# Patient Record
Sex: MALE | Race: BLACK OR AFRICAN AMERICAN | NOT HISPANIC OR LATINO | URBAN - METROPOLITAN AREA
[De-identification: names, ages, dates, MRNs, and addresses within clinical notes are randomized per-mention and may not be internally consistent; named-entity substitution may affect disease eponyms.]

---

## 2020-05-26 ENCOUNTER — INPATIENT (INPATIENT)
Facility: HOSPITAL | Age: 60
LOS: 3 days | Discharge: HOME | End: 2020-05-30
Attending: HOSPITALIST | Admitting: HOSPITALIST
Payer: MEDICARE

## 2020-05-26 VITALS
RESPIRATION RATE: 17 BRPM | OXYGEN SATURATION: 100 % | SYSTOLIC BLOOD PRESSURE: 84 MMHG | TEMPERATURE: 98 F | DIASTOLIC BLOOD PRESSURE: 61 MMHG | HEART RATE: 115 BPM

## 2020-05-26 LAB
ALBUMIN SERPL ELPH-MCNC: 4.5 G/DL — SIGNIFICANT CHANGE UP (ref 3.3–5)
ALP SERPL-CCNC: 33 U/L — LOW (ref 40–120)
ALT FLD-CCNC: 12 U/L — SIGNIFICANT CHANGE UP (ref 4–41)
ANION GAP SERPL CALC-SCNC: 19 MMO/L — HIGH (ref 7–14)
AST SERPL-CCNC: 36 U/L — SIGNIFICANT CHANGE UP (ref 4–40)
BASE EXCESS BLDV CALC-SCNC: -4 MMOL/L — SIGNIFICANT CHANGE UP
BASOPHILS # BLD AUTO: 0.04 K/UL — SIGNIFICANT CHANGE UP (ref 0–0.2)
BASOPHILS NFR BLD AUTO: 0.4 % — SIGNIFICANT CHANGE UP (ref 0–2)
BILIRUB SERPL-MCNC: 0.4 MG/DL — SIGNIFICANT CHANGE UP (ref 0.2–1.2)
BLOOD GAS VENOUS - CREATININE: 2.25 MG/DL — HIGH (ref 0.5–1.3)
BLOOD GAS VENOUS - FIO2: 21 — SIGNIFICANT CHANGE UP
BUN SERPL-MCNC: 37 MG/DL — HIGH (ref 7–23)
CALCIUM SERPL-MCNC: 9.8 MG/DL — SIGNIFICANT CHANGE UP (ref 8.4–10.5)
CHLORIDE BLDV-SCNC: 101 MMOL/L — SIGNIFICANT CHANGE UP (ref 96–108)
CHLORIDE SERPL-SCNC: 93 MMOL/L — LOW (ref 98–107)
CO2 SERPL-SCNC: 18 MMOL/L — LOW (ref 22–31)
CREAT SERPL-MCNC: 2.26 MG/DL — HIGH (ref 0.5–1.3)
EOSINOPHIL # BLD AUTO: 0.01 K/UL — SIGNIFICANT CHANGE UP (ref 0–0.5)
EOSINOPHIL NFR BLD AUTO: 0.1 % — SIGNIFICANT CHANGE UP (ref 0–6)
GAS PNL BLDV: 131 MMOL/L — LOW (ref 136–146)
GLUCOSE BLDV-MCNC: 113 MG/DL — HIGH (ref 70–99)
GLUCOSE SERPL-MCNC: 121 MG/DL — HIGH (ref 70–99)
HCO3 BLDV-SCNC: 19 MMOL/L — LOW (ref 20–27)
HCT VFR BLD CALC: 35.9 % — LOW (ref 39–50)
HCT VFR BLDV CALC: 37.1 % — LOW (ref 39–51)
HGB BLD-MCNC: 11.8 G/DL — LOW (ref 13–17)
HGB BLDV-MCNC: 12.1 G/DL — LOW (ref 13–17)
IMM GRANULOCYTES NFR BLD AUTO: 0.4 % — SIGNIFICANT CHANGE UP (ref 0–1.5)
LACTATE BLDV-MCNC: 4 MMOL/L — CRITICAL HIGH (ref 0.5–2)
LYMPHOCYTES # BLD AUTO: 1.66 K/UL — SIGNIFICANT CHANGE UP (ref 1–3.3)
LYMPHOCYTES # BLD AUTO: 16.3 % — SIGNIFICANT CHANGE UP (ref 13–44)
MCHC RBC-ENTMCNC: 29.7 PG — SIGNIFICANT CHANGE UP (ref 27–34)
MCHC RBC-ENTMCNC: 32.9 % — SIGNIFICANT CHANGE UP (ref 32–36)
MCV RBC AUTO: 90.4 FL — SIGNIFICANT CHANGE UP (ref 80–100)
MONOCYTES # BLD AUTO: 0.98 K/UL — HIGH (ref 0–0.9)
MONOCYTES NFR BLD AUTO: 9.6 % — SIGNIFICANT CHANGE UP (ref 2–14)
NEUTROPHILS # BLD AUTO: 7.48 K/UL — HIGH (ref 1.8–7.4)
NEUTROPHILS NFR BLD AUTO: 73.2 % — SIGNIFICANT CHANGE UP (ref 43–77)
NRBC # FLD: 0 K/UL — SIGNIFICANT CHANGE UP (ref 0–0)
PCO2 BLDV: 44 MMHG — SIGNIFICANT CHANGE UP (ref 41–51)
PH BLDV: 7.31 PH — LOW (ref 7.32–7.43)
PLATELET # BLD AUTO: 352 K/UL — SIGNIFICANT CHANGE UP (ref 150–400)
PMV BLD: 9 FL — SIGNIFICANT CHANGE UP (ref 7–13)
PO2 BLDV: < 24 MMHG — LOW (ref 35–40)
POTASSIUM BLDV-SCNC: 5.2 MMOL/L — HIGH (ref 3.4–4.5)
POTASSIUM SERPL-MCNC: 7.5 MMOL/L — CRITICAL HIGH (ref 3.5–5.3)
POTASSIUM SERPL-SCNC: 7.5 MMOL/L — CRITICAL HIGH (ref 3.5–5.3)
PROT SERPL-MCNC: 8.9 G/DL — HIGH (ref 6–8.3)
RBC # BLD: 3.97 M/UL — LOW (ref 4.2–5.8)
RBC # FLD: 13.2 % — SIGNIFICANT CHANGE UP (ref 10.3–14.5)
SAO2 % BLDV: 22.5 % — LOW (ref 60–85)
SARS-COV-2 RNA SPEC QL NAA+PROBE: SIGNIFICANT CHANGE UP
SODIUM SERPL-SCNC: 130 MMOL/L — LOW (ref 135–145)
WBC # BLD: 10.21 K/UL — SIGNIFICANT CHANGE UP (ref 3.8–10.5)
WBC # FLD AUTO: 10.21 K/UL — SIGNIFICANT CHANGE UP (ref 3.8–10.5)

## 2020-05-26 PROCEDURE — 70450 CT HEAD/BRAIN W/O DYE: CPT | Mod: 26

## 2020-05-26 PROCEDURE — 71045 X-RAY EXAM CHEST 1 VIEW: CPT | Mod: 26

## 2020-05-26 RX ORDER — SODIUM CHLORIDE 9 MG/ML
1000 INJECTION INTRAMUSCULAR; INTRAVENOUS; SUBCUTANEOUS ONCE
Refills: 0 | Status: COMPLETED | OUTPATIENT
Start: 2020-05-26 | End: 2020-05-26

## 2020-05-26 RX ORDER — SODIUM CHLORIDE 9 MG/ML
500 INJECTION INTRAMUSCULAR; INTRAVENOUS; SUBCUTANEOUS ONCE
Refills: 0 | Status: COMPLETED | OUTPATIENT
Start: 2020-05-26 | End: 2020-05-26

## 2020-05-26 RX ADMIN — SODIUM CHLORIDE 1000 MILLILITER(S): 9 INJECTION INTRAMUSCULAR; INTRAVENOUS; SUBCUTANEOUS at 23:16

## 2020-05-26 RX ADMIN — SODIUM CHLORIDE 500 MILLILITER(S): 9 INJECTION INTRAMUSCULAR; INTRAVENOUS; SUBCUTANEOUS at 23:17

## 2020-05-26 NOTE — ED ADULT NURSE NOTE - CHIEF COMPLAINT QUOTE
Patient brought in from [facility] for agitation. Per EMS, NH denied fevers/cough. Patient was given Haldol 5mg IM at NH around 4:15pm. Patient denies any pain. Hx. schizophrenia. Patient denies S/I, H/I, hallucinations. Currently calm and cooperative in triage.

## 2020-05-26 NOTE — ED ADULT NURSE NOTE - OBJECTIVE STATEMENT
Pt presents to room 22, aox1-2 to person and place, states year is "2072", ambulatory, pmhx DM type2, HTN and schizophrenia, coming to ED from [facility] for evaluation of agitation. As per NH, pt was "agitated, restless and became physically aggressive" to their staff, pt was given Haldol 5mg IM at 1615. Pt is cooperative at this time, denies SI/HI, denies any auditory/visual hallucinations at this time. Pt appears to be sleepy, responds to questions but does not recall events leading up to ER visit. Breathing even and non-labored, sinus tachy on cardiac monitor, appears non-diaphoretic. Pt denies any chest pain, SOB, fever or N/V/D. MD at bedside. Awaiting further plan of care

## 2020-05-26 NOTE — ED ADULT TRIAGE NOTE - CHIEF COMPLAINT QUOTE
Patient brought in from [facility] for agitation. Per EMS, NH denied fevers/cough. Patient was given Haldol 5mg IM at NH around 4:15pm. Patient denies any pain. Hx. schizophrenia. Patient denies S/I, H/I, hallucinations. Patient brought in from [facility] for agitation. Per EMS, NH denied fevers/cough. Patient was given Haldol 5mg IM at NH around 4:15pm. Patient denies any pain. Hx. schizophrenia. Patient denies S/I, H/I, hallucinations. Currently calm and cooperative in triage.

## 2020-05-26 NOTE — ED PROVIDER NOTE - ATTENDING CONTRIBUTION TO CARE
DR. HUBBARD, ATTENDING MD-  I performed a face to face bedside interview with the patient regarding history of present illness, review of symptoms and past medical history. I completed an independent physical exam.  I have discussed the patient's plan of care with the resident.   Documentation as above in the note.    61 y/o male h/o schizophrenia, seizure d/o bibems for agitation.  Per ems report, pt was agitated and aggressive towards staff.  He was given haldol IM prior to ems transport.  Pt denies any symptoms.  States he is worried about "the virus."  Lethargic, oriented to person only, dry mm, ctabil, s1s2 rrr no m/r/g, perrla, eomi, neck supple.  Evaluate for organic cause of agitation, delerium, obtain cbc cmp cxr ua ucx ct head reassess.

## 2020-05-26 NOTE — ED PROVIDER NOTE - PROGRESS NOTE DETAILS
Salvador PGY3: noted DONA unclear etiology no OSH records demonstrating dona, will hydrate check labs after ivf, send urine, accepted to hospitalist, will keep on tele pending repeat bmp.

## 2020-05-26 NOTE — ED PROVIDER NOTE - CARE PLAN
Principal Discharge DX:	Agitation Principal Discharge DX:	Agitation  Secondary Diagnosis:	DONA (acute kidney injury)

## 2020-05-26 NOTE — ED PROVIDER NOTE - CLINICAL SUMMARY MEDICAL DECISION MAKING FREE TEXT BOX
Salvador PGY3: 60M hx of schizophrenia, seizure, presents from [facility] with a cc of agitation per EMS and transfer note pt had episode today where got agitated with house staff, got haldol prior to ED arrival. In ED, pt calm and cooperative, feels well though feels as if "the virus is taking him", cannot give clear history as to what happened earlier today exam vss non toxic appearing somnolent, given limited historian and unknown to ED will check labs, ekg, cxr, cth, reassess when more alert

## 2020-05-26 NOTE — ED PROVIDER NOTE - PHYSICAL EXAMINATION
GEN APPEARANCE: WDWN, somnolent, cooperative, non-toxic appearing and in NAD, A&Ox3.   HEAD: Atraumatic, normocephalic   EYES: PERRLa, EOMI, vision grossly intact.   EARS: Gross hearing intact.   NOSE: No nasal discharge, no external evidence of epistaxis.   NECK: Supple  CV: RRR, S1S2, no c/r/m/g. No cyanosis or pallor. Extremities warm, well perfused. Cap refill <2 seconds. No bruits.   LUNGS: CTAB. No wheezing. No rales. No rhonchi. No diminished breath sounds.   ABDOMEN: Soft, NTND. No guarding or rebound. No masses.   MSK: Spine appears normal, no spine point tenderness. No CVA ttp. No joint erythema or tenderness. Normal muscular development. Pelvis stable.  EXTREMITIES: No peripheral edema. No obvious joint or bony deformity.  NEURO:. somnolent. Speech normal. Sensation and motor normal x4 extremities.   SKIN: Normal color for race, warm, dry and intact. No evidence of rash.  PSYCH: Normal mood and affect.

## 2020-05-27 DIAGNOSIS — F20.9 SCHIZOPHRENIA, UNSPECIFIED: ICD-10-CM

## 2020-05-27 DIAGNOSIS — E78.5 HYPERLIPIDEMIA, UNSPECIFIED: ICD-10-CM

## 2020-05-27 DIAGNOSIS — E11.9 TYPE 2 DIABETES MELLITUS WITHOUT COMPLICATIONS: ICD-10-CM

## 2020-05-27 DIAGNOSIS — E87.5 HYPERKALEMIA: ICD-10-CM

## 2020-05-27 DIAGNOSIS — R19.7 DIARRHEA, UNSPECIFIED: ICD-10-CM

## 2020-05-27 DIAGNOSIS — R56.9 UNSPECIFIED CONVULSIONS: ICD-10-CM

## 2020-05-27 DIAGNOSIS — I10 ESSENTIAL (PRIMARY) HYPERTENSION: ICD-10-CM

## 2020-05-27 DIAGNOSIS — R45.1 RESTLESSNESS AND AGITATION: ICD-10-CM

## 2020-05-27 DIAGNOSIS — Z02.9 ENCOUNTER FOR ADMINISTRATIVE EXAMINATIONS, UNSPECIFIED: ICD-10-CM

## 2020-05-27 DIAGNOSIS — F05 DELIRIUM DUE TO KNOWN PHYSIOLOGICAL CONDITION: ICD-10-CM

## 2020-05-27 DIAGNOSIS — Z00.00 ENCOUNTER FOR GENERAL ADULT MEDICAL EXAMINATION WITHOUT ABNORMAL FINDINGS: ICD-10-CM

## 2020-05-27 DIAGNOSIS — E87.2 ACIDOSIS: ICD-10-CM

## 2020-05-27 DIAGNOSIS — N17.9 ACUTE KIDNEY FAILURE, UNSPECIFIED: ICD-10-CM

## 2020-05-27 LAB
ANION GAP SERPL CALC-SCNC: 15 MMO/L — HIGH (ref 7–14)
ANION GAP SERPL CALC-SCNC: 16 MMO/L — HIGH (ref 7–14)
APPEARANCE UR: CLEAR — SIGNIFICANT CHANGE UP
BACTERIA # UR AUTO: NEGATIVE — SIGNIFICANT CHANGE UP
BASE EXCESS BLDV CALC-SCNC: -3.1 MMOL/L — SIGNIFICANT CHANGE UP
BASE EXCESS BLDV CALC-SCNC: -5.1 MMOL/L — SIGNIFICANT CHANGE UP
BASOPHILS # BLD AUTO: 0.03 K/UL — SIGNIFICANT CHANGE UP (ref 0–0.2)
BASOPHILS NFR BLD AUTO: 0.4 % — SIGNIFICANT CHANGE UP (ref 0–2)
BILIRUB UR-MCNC: NEGATIVE — SIGNIFICANT CHANGE UP
BLOOD GAS VENOUS - CREATININE: 1.99 MG/DL — HIGH (ref 0.5–1.3)
BLOOD UR QL VISUAL: NEGATIVE — SIGNIFICANT CHANGE UP
BUN SERPL-MCNC: 33 MG/DL — HIGH (ref 7–23)
BUN SERPL-MCNC: 36 MG/DL — HIGH (ref 7–23)
CALCIUM SERPL-MCNC: 9 MG/DL — SIGNIFICANT CHANGE UP (ref 8.4–10.5)
CALCIUM SERPL-MCNC: 9.4 MG/DL — SIGNIFICANT CHANGE UP (ref 8.4–10.5)
CHLORIDE BLDV-SCNC: 102 MMOL/L — SIGNIFICANT CHANGE UP (ref 96–108)
CHLORIDE SERPL-SCNC: 100 MMOL/L — SIGNIFICANT CHANGE UP (ref 98–107)
CHLORIDE SERPL-SCNC: 99 MMOL/L — SIGNIFICANT CHANGE UP (ref 98–107)
CK SERPL-CCNC: 436 U/L — HIGH (ref 30–200)
CO2 SERPL-SCNC: 19 MMOL/L — LOW (ref 22–31)
CO2 SERPL-SCNC: 19 MMOL/L — LOW (ref 22–31)
COLOR SPEC: YELLOW — SIGNIFICANT CHANGE UP
CREAT ?TM UR-MCNC: 169.1 MG/DL — SIGNIFICANT CHANGE UP
CREAT SERPL-MCNC: 1.73 MG/DL — HIGH (ref 0.5–1.3)
CREAT SERPL-MCNC: 1.92 MG/DL — HIGH (ref 0.5–1.3)
CULTURE RESULTS: SIGNIFICANT CHANGE UP
EOSINOPHIL # BLD AUTO: 0.07 K/UL — SIGNIFICANT CHANGE UP (ref 0–0.5)
EOSINOPHIL NFR BLD AUTO: 1 % — SIGNIFICANT CHANGE UP (ref 0–6)
FERRITIN SERPL-MCNC: 888.1 NG/ML — HIGH (ref 30–400)
FOLATE SERPL-MCNC: > 20 NG/ML — HIGH (ref 4.7–20)
GAS PNL BLDV: 131 MMOL/L — LOW (ref 136–146)
GAS PNL BLDV: 135 MMOL/L — LOW (ref 136–146)
GLUCOSE BLDV-MCNC: 105 MG/DL — HIGH (ref 70–99)
GLUCOSE BLDV-MCNC: 99 MG/DL — SIGNIFICANT CHANGE UP (ref 70–99)
GLUCOSE SERPL-MCNC: 101 MG/DL — HIGH (ref 70–99)
GLUCOSE SERPL-MCNC: 91 MG/DL — SIGNIFICANT CHANGE UP (ref 70–99)
GLUCOSE UR-MCNC: NEGATIVE — SIGNIFICANT CHANGE UP
HCO3 BLDV-SCNC: 20 MMOL/L — SIGNIFICANT CHANGE UP (ref 20–27)
HCO3 BLDV-SCNC: 20 MMOL/L — SIGNIFICANT CHANGE UP (ref 20–27)
HCT VFR BLD CALC: 31.8 % — LOW (ref 39–50)
HCT VFR BLDV CALC: 27.5 % — LOW (ref 39–51)
HCT VFR BLDV CALC: 32.4 % — LOW (ref 39–51)
HGB BLD-MCNC: 10.4 G/DL — LOW (ref 13–17)
HGB BLDV-MCNC: 10.5 G/DL — LOW (ref 13–17)
HGB BLDV-MCNC: 8.9 G/DL — LOW (ref 13–17)
HYALINE CASTS # UR AUTO: HIGH
IMM GRANULOCYTES NFR BLD AUTO: 0.3 % — SIGNIFICANT CHANGE UP (ref 0–1.5)
IRON SATN MFR SERPL: 197 UG/DL — SIGNIFICANT CHANGE UP (ref 155–535)
IRON SATN MFR SERPL: 78 UG/DL — SIGNIFICANT CHANGE UP (ref 45–165)
KETONES UR-MCNC: NEGATIVE — SIGNIFICANT CHANGE UP
LACTATE BLDV-MCNC: 2.4 MMOL/L — HIGH (ref 0.5–2)
LACTATE BLDV-MCNC: 2.4 MMOL/L — HIGH (ref 0.5–2)
LEUKOCYTE ESTERASE UR-ACNC: NEGATIVE — SIGNIFICANT CHANGE UP
LYMPHOCYTES # BLD AUTO: 1.75 K/UL — SIGNIFICANT CHANGE UP (ref 1–3.3)
LYMPHOCYTES # BLD AUTO: 25.7 % — SIGNIFICANT CHANGE UP (ref 13–44)
MAGNESIUM SERPL-MCNC: 1.2 MG/DL — LOW (ref 1.6–2.6)
MCHC RBC-ENTMCNC: 29.5 PG — SIGNIFICANT CHANGE UP (ref 27–34)
MCHC RBC-ENTMCNC: 32.7 % — SIGNIFICANT CHANGE UP (ref 32–36)
MCV RBC AUTO: 90.1 FL — SIGNIFICANT CHANGE UP (ref 80–100)
MONOCYTES # BLD AUTO: 0.76 K/UL — SIGNIFICANT CHANGE UP (ref 0–0.9)
MONOCYTES NFR BLD AUTO: 11.2 % — SIGNIFICANT CHANGE UP (ref 2–14)
NEUTROPHILS # BLD AUTO: 4.18 K/UL — SIGNIFICANT CHANGE UP (ref 1.8–7.4)
NEUTROPHILS NFR BLD AUTO: 61.4 % — SIGNIFICANT CHANGE UP (ref 43–77)
NITRITE UR-MCNC: NEGATIVE — SIGNIFICANT CHANGE UP
NRBC # FLD: 0 K/UL — SIGNIFICANT CHANGE UP (ref 0–0)
OSMOLALITY UR: 359 MOSMO/KG — SIGNIFICANT CHANGE UP (ref 50–1200)
PCO2 BLDV: 37 MMHG — LOW (ref 41–51)
PCO2 BLDV: 45 MMHG — SIGNIFICANT CHANGE UP (ref 41–51)
PH BLDV: 7.31 PH — LOW (ref 7.32–7.43)
PH BLDV: 7.34 PH — SIGNIFICANT CHANGE UP (ref 7.32–7.43)
PH UR: 5.5 — SIGNIFICANT CHANGE UP (ref 5–8)
PHOSPHATE SERPL-MCNC: 5.3 MG/DL — HIGH (ref 2.5–4.5)
PLATELET # BLD AUTO: 305 K/UL — SIGNIFICANT CHANGE UP (ref 150–400)
PMV BLD: 8.7 FL — SIGNIFICANT CHANGE UP (ref 7–13)
PO2 BLDV: 56 MMHG — HIGH (ref 35–40)
PO2 BLDV: < 24 MMHG — LOW (ref 35–40)
POTASSIUM BLDV-SCNC: 4.6 MMOL/L — HIGH (ref 3.4–4.5)
POTASSIUM BLDV-SCNC: 6.3 MMOL/L — CRITICAL HIGH (ref 3.4–4.5)
POTASSIUM SERPL-MCNC: 4.8 MMOL/L — SIGNIFICANT CHANGE UP (ref 3.5–5.3)
POTASSIUM SERPL-MCNC: 5 MMOL/L — SIGNIFICANT CHANGE UP (ref 3.5–5.3)
POTASSIUM SERPL-SCNC: 4.8 MMOL/L — SIGNIFICANT CHANGE UP (ref 3.5–5.3)
POTASSIUM SERPL-SCNC: 5 MMOL/L — SIGNIFICANT CHANGE UP (ref 3.5–5.3)
PROT UR-MCNC: 20 — SIGNIFICANT CHANGE UP
RBC # BLD: 3.53 M/UL — LOW (ref 4.2–5.8)
RBC # FLD: 13.2 % — SIGNIFICANT CHANGE UP (ref 10.3–14.5)
RBC CASTS # UR COMP ASSIST: SIGNIFICANT CHANGE UP (ref 0–?)
SAO2 % BLDV: 10.5 % — LOW (ref 60–85)
SAO2 % BLDV: 87 % — HIGH (ref 60–85)
SODIUM SERPL-SCNC: 134 MMOL/L — LOW (ref 135–145)
SODIUM SERPL-SCNC: 134 MMOL/L — LOW (ref 135–145)
SODIUM UR-SCNC: 24 MMOL/L — SIGNIFICANT CHANGE UP
SP GR SPEC: 1.02 — SIGNIFICANT CHANGE UP (ref 1–1.04)
SPECIMEN SOURCE: SIGNIFICANT CHANGE UP
SQUAMOUS # UR AUTO: SIGNIFICANT CHANGE UP
UIBC SERPL-MCNC: 118.7 UG/DL — SIGNIFICANT CHANGE UP (ref 110–370)
UROBILINOGEN FLD QL: SIGNIFICANT CHANGE UP
VIT B12 SERPL-MCNC: 358 PG/ML — SIGNIFICANT CHANGE UP (ref 200–900)
WBC # BLD: 6.81 K/UL — SIGNIFICANT CHANGE UP (ref 3.8–10.5)
WBC # FLD AUTO: 6.81 K/UL — SIGNIFICANT CHANGE UP (ref 3.8–10.5)
WBC UR QL: SIGNIFICANT CHANGE UP (ref 0–?)

## 2020-05-27 PROCEDURE — 12345: CPT | Mod: NC,GC

## 2020-05-27 PROCEDURE — 90792 PSYCH DIAG EVAL W/MED SRVCS: CPT

## 2020-05-27 PROCEDURE — 76770 US EXAM ABDO BACK WALL COMP: CPT | Mod: 26

## 2020-05-27 PROCEDURE — 99223 1ST HOSP IP/OBS HIGH 75: CPT | Mod: GC

## 2020-05-27 RX ORDER — DIVALPROEX SODIUM 500 MG/1
250 TABLET, DELAYED RELEASE ORAL
Refills: 0 | Status: DISCONTINUED | OUTPATIENT
Start: 2020-05-27 | End: 2020-05-30

## 2020-05-27 RX ORDER — INSULIN HUMAN 100 [IU]/ML
10 INJECTION, SOLUTION SUBCUTANEOUS ONCE
Refills: 0 | Status: DISCONTINUED | OUTPATIENT
Start: 2020-05-27 | End: 2020-05-27

## 2020-05-27 RX ORDER — LEVETIRACETAM 250 MG/1
1000 TABLET, FILM COATED ORAL
Refills: 0 | Status: DISCONTINUED | OUTPATIENT
Start: 2020-05-27 | End: 2020-05-30

## 2020-05-27 RX ORDER — QUETIAPINE FUMARATE 200 MG/1
25 TABLET, FILM COATED ORAL
Refills: 0 | Status: DISCONTINUED | OUTPATIENT
Start: 2020-05-27 | End: 2020-05-27

## 2020-05-27 RX ORDER — QUETIAPINE FUMARATE 200 MG/1
25 TABLET, FILM COATED ORAL AT BEDTIME
Refills: 0 | Status: DISCONTINUED | OUTPATIENT
Start: 2020-05-27 | End: 2020-05-30

## 2020-05-27 RX ORDER — TRAZODONE HCL 50 MG
25 TABLET ORAL AT BEDTIME
Refills: 0 | Status: DISCONTINUED | OUTPATIENT
Start: 2020-05-27 | End: 2020-05-30

## 2020-05-27 RX ORDER — SODIUM ZIRCONIUM CYCLOSILICATE 10 G/10G
5 POWDER, FOR SUSPENSION ORAL ONCE
Refills: 0 | Status: DISCONTINUED | OUTPATIENT
Start: 2020-05-27 | End: 2020-05-27

## 2020-05-27 RX ORDER — INSULIN LISPRO 100/ML
VIAL (ML) SUBCUTANEOUS
Refills: 0 | Status: DISCONTINUED | OUTPATIENT
Start: 2020-05-27 | End: 2020-05-30

## 2020-05-27 RX ORDER — LANOLIN ALCOHOL/MO/W.PET/CERES
6 CREAM (GRAM) TOPICAL AT BEDTIME
Refills: 0 | Status: DISCONTINUED | OUTPATIENT
Start: 2020-05-27 | End: 2020-05-30

## 2020-05-27 RX ORDER — LEVETIRACETAM 250 MG/1
500 TABLET, FILM COATED ORAL
Refills: 0 | Status: DISCONTINUED | OUTPATIENT
Start: 2020-05-27 | End: 2020-05-27

## 2020-05-27 RX ORDER — SODIUM CHLORIDE 9 MG/ML
1000 INJECTION, SOLUTION INTRAVENOUS
Refills: 0 | Status: DISCONTINUED | OUTPATIENT
Start: 2020-05-27 | End: 2020-05-30

## 2020-05-27 RX ORDER — DEXTROSE 50 % IN WATER 50 %
50 SYRINGE (ML) INTRAVENOUS ONCE
Refills: 0 | Status: DISCONTINUED | OUTPATIENT
Start: 2020-05-27 | End: 2020-05-27

## 2020-05-27 RX ORDER — CHOLECALCIFEROL (VITAMIN D3) 125 MCG
1 CAPSULE ORAL
Qty: 0 | Refills: 0 | DISCHARGE

## 2020-05-27 RX ORDER — FOLIC ACID 0.8 MG
1 TABLET ORAL DAILY
Refills: 0 | Status: DISCONTINUED | OUTPATIENT
Start: 2020-05-27 | End: 2020-05-30

## 2020-05-27 RX ORDER — CALCIUM GLUCONATE 100 MG/ML
1 VIAL (ML) INTRAVENOUS ONCE
Refills: 0 | Status: COMPLETED | OUTPATIENT
Start: 2020-05-27 | End: 2020-05-27

## 2020-05-27 RX ORDER — INSULIN LISPRO 100/ML
VIAL (ML) SUBCUTANEOUS AT BEDTIME
Refills: 0 | Status: DISCONTINUED | OUTPATIENT
Start: 2020-05-27 | End: 2020-05-30

## 2020-05-27 RX ORDER — QUETIAPINE FUMARATE 200 MG/1
25 TABLET, FILM COATED ORAL EVERY 6 HOURS
Refills: 0 | Status: DISCONTINUED | OUTPATIENT
Start: 2020-05-27 | End: 2020-05-28

## 2020-05-27 RX ORDER — DEXTROSE 50 % IN WATER 50 %
12.5 SYRINGE (ML) INTRAVENOUS ONCE
Refills: 0 | Status: DISCONTINUED | OUTPATIENT
Start: 2020-05-27 | End: 2020-05-30

## 2020-05-27 RX ORDER — ASPIRIN/CALCIUM CARB/MAGNESIUM 324 MG
81 TABLET ORAL DAILY
Refills: 0 | Status: DISCONTINUED | OUTPATIENT
Start: 2020-05-27 | End: 2020-05-30

## 2020-05-27 RX ORDER — DEXTROSE 50 % IN WATER 50 %
25 SYRINGE (ML) INTRAVENOUS ONCE
Refills: 0 | Status: DISCONTINUED | OUTPATIENT
Start: 2020-05-27 | End: 2020-05-30

## 2020-05-27 RX ORDER — GLUCAGON INJECTION, SOLUTION 0.5 MG/.1ML
1 INJECTION, SOLUTION SUBCUTANEOUS ONCE
Refills: 0 | Status: DISCONTINUED | OUTPATIENT
Start: 2020-05-27 | End: 2020-05-30

## 2020-05-27 RX ORDER — SODIUM CHLORIDE 9 MG/ML
1000 INJECTION INTRAMUSCULAR; INTRAVENOUS; SUBCUTANEOUS
Refills: 0 | Status: DISCONTINUED | OUTPATIENT
Start: 2020-05-27 | End: 2020-05-29

## 2020-05-27 RX ORDER — FOLIC ACID 0.8 MG
1 TABLET ORAL
Qty: 0 | Refills: 0 | DISCHARGE

## 2020-05-27 RX ORDER — HEPARIN SODIUM 5000 [USP'U]/ML
5000 INJECTION INTRAVENOUS; SUBCUTANEOUS EVERY 12 HOURS
Refills: 0 | Status: DISCONTINUED | OUTPATIENT
Start: 2020-05-27 | End: 2020-05-30

## 2020-05-27 RX ORDER — TRAZODONE HCL 50 MG
25 TABLET ORAL
Refills: 0 | Status: DISCONTINUED | OUTPATIENT
Start: 2020-05-27 | End: 2020-05-27

## 2020-05-27 RX ORDER — LEVETIRACETAM 250 MG/1
2 TABLET, FILM COATED ORAL
Qty: 0 | Refills: 0 | DISCHARGE

## 2020-05-27 RX ORDER — DEXTROSE 50 % IN WATER 50 %
15 SYRINGE (ML) INTRAVENOUS ONCE
Refills: 0 | Status: DISCONTINUED | OUTPATIENT
Start: 2020-05-27 | End: 2020-05-30

## 2020-05-27 RX ORDER — ATORVASTATIN CALCIUM 80 MG/1
10 TABLET, FILM COATED ORAL AT BEDTIME
Refills: 0 | Status: DISCONTINUED | OUTPATIENT
Start: 2020-05-27 | End: 2020-05-30

## 2020-05-27 RX ORDER — SODIUM CHLORIDE 9 MG/ML
1000 INJECTION INTRAMUSCULAR; INTRAVENOUS; SUBCUTANEOUS
Refills: 0 | Status: DISCONTINUED | OUTPATIENT
Start: 2020-05-27 | End: 2020-05-27

## 2020-05-27 RX ADMIN — LEVETIRACETAM 1000 MILLIGRAM(S): 250 TABLET, FILM COATED ORAL at 06:11

## 2020-05-27 RX ADMIN — Medication 1 MILLIGRAM(S): at 12:19

## 2020-05-27 RX ADMIN — LEVETIRACETAM 1000 MILLIGRAM(S): 250 TABLET, FILM COATED ORAL at 17:20

## 2020-05-27 RX ADMIN — SODIUM CHLORIDE 1000 MILLILITER(S): 9 INJECTION INTRAMUSCULAR; INTRAVENOUS; SUBCUTANEOUS at 01:09

## 2020-05-27 RX ADMIN — Medication 25 MILLIGRAM(S): at 06:11

## 2020-05-27 RX ADMIN — SODIUM CHLORIDE 500 MILLILITER(S): 9 INJECTION INTRAMUSCULAR; INTRAVENOUS; SUBCUTANEOUS at 01:09

## 2020-05-27 RX ADMIN — Medication 6 MILLIGRAM(S): at 21:51

## 2020-05-27 RX ADMIN — Medication 81 MILLIGRAM(S): at 12:19

## 2020-05-27 RX ADMIN — SODIUM CHLORIDE 100 MILLILITER(S): 9 INJECTION INTRAMUSCULAR; INTRAVENOUS; SUBCUTANEOUS at 17:20

## 2020-05-27 RX ADMIN — Medication 100 GRAM(S): at 01:29

## 2020-05-27 RX ADMIN — SODIUM CHLORIDE 75 MILLILITER(S): 9 INJECTION INTRAMUSCULAR; INTRAVENOUS; SUBCUTANEOUS at 02:38

## 2020-05-27 RX ADMIN — Medication 1: at 17:29

## 2020-05-27 RX ADMIN — QUETIAPINE FUMARATE 25 MILLIGRAM(S): 200 TABLET, FILM COATED ORAL at 06:11

## 2020-05-27 RX ADMIN — HEPARIN SODIUM 5000 UNIT(S): 5000 INJECTION INTRAVENOUS; SUBCUTANEOUS at 17:20

## 2020-05-27 RX ADMIN — DIVALPROEX SODIUM 250 MILLIGRAM(S): 500 TABLET, DELAYED RELEASE ORAL at 06:11

## 2020-05-27 RX ADMIN — DIVALPROEX SODIUM 250 MILLIGRAM(S): 500 TABLET, DELAYED RELEASE ORAL at 17:20

## 2020-05-27 RX ADMIN — ATORVASTATIN CALCIUM 10 MILLIGRAM(S): 80 TABLET, FILM COATED ORAL at 21:51

## 2020-05-27 RX ADMIN — QUETIAPINE FUMARATE 25 MILLIGRAM(S): 200 TABLET, FILM COATED ORAL at 21:51

## 2020-05-27 RX ADMIN — Medication 25 MILLIGRAM(S): at 21:51

## 2020-05-27 NOTE — CHART NOTE - NSCHARTNOTEFT_GEN_A_CORE
Spoke with Psychiatry Attending Dr. Morillo who states that he does not think patient has an underlying psychiatric disorder.    Recs:  - switch seroquel and trazodone qHS  - seroquel 25mg q6hrs PO prn for agitation  - no psych contraindication for discharge

## 2020-05-27 NOTE — ED ADULT NURSE REASSESSMENT NOTE - NS ED NURSE REASSESS COMMENT FT1
MD Shah at bedside, made aware FS 91, as per MD to hold off insulin and dextrose, ok to give other meds. Rpt labs drawn and sent, pt offers no complaints at this time. Awaiting bed assignment. Will continue to monitor.

## 2020-05-27 NOTE — H&P ADULT - PROBLEM SELECTOR PLAN 3
-7.5 (moderately hemolyzed) --> 4.8 s/p 1.5 L fluids  -s/p Calcium gluconate. EKG grossly unchanged  -given K normalization, hold insulin and lokelma -pH 7.31 in setting of DONA  -follow up repeat VBG -pH 7.31 in setting of DONA  -repeat VBG showing pH 7.31, however DONA is resolving  -f/u repeat VBG in AM to see if improving  -if no improvement in acidosis, consider nephrology consult

## 2020-05-27 NOTE — DISCHARGE NOTE PROVIDER - NSDCMRMEDTOKEN_GEN_ALL_CORE_FT
Admelog 100 units/mL injectable solution: Sliding Scale   aspirin 81 mg oral tablet: 1 tab(s) orally once a day  atorvastatin 10 mg oral tablet: 1 tab(s) orally once a day  divalproex sodium 250 mg oral delayed release tablet: 1 tab(s) orally 2 times a day  folic acid 1 mg oral tablet: 1 tab(s) orally once a day  Keppra  mg oral tablet, extended release: 2 tab(s) orally 2 times a day  lisinopril 10 mg oral tablet: 1 tab(s) orally once a day  Melatonin 5 mg oral tablet: 1 tab(s) orally once a day (at bedtime)  metFORMIN 1000 mg oral tablet: 1 tab(s) orally 2 times a day  SEROquel 25 mg oral tablet: 1 tab(s) orally 2 times a day  traZODone 50 mg oral tablet: 0.5 tab(s) orally 2 times a day  Vitamin D3 50,000 intl units (1250 mcg) oral capsule: 1 tab(s) orally every 7 days Admelog 100 units/mL injectable solution: Sliding Scale   aspirin 81 mg oral tablet: 1 tab(s) orally once a day  atorvastatin 10 mg oral tablet: 1 tab(s) orally once a day  divalproex sodium 250 mg oral delayed release tablet: 1 tab(s) orally 2 times a day  folic acid 1 mg oral tablet: 1 tab(s) orally once a day  Keppra  mg oral tablet, extended release: 2 tab(s) orally 2 times a day  lisinopril 10 mg oral tablet: 1 tab(s) orally once a day  Melatonin 5 mg oral tablet: 1 tab(s) orally once a day (at bedtime)  metFORMIN 1000 mg oral tablet: 1 tab(s) orally 2 times a day  SEROquel 25 mg oral tablet: 1 tab(s) orally 2 times a day  SEROquel 25 mg oral tablet: 1 tab(s) orally once a day (at bedtime)  traZODone: 50 milligram(s) orally once a day  traZODone 50 mg oral tablet: 0.5 tab(s) orally 2 times a day  Vitamin D3 50,000 intl units (1250 mcg) oral capsule: 1 tab(s) orally every 7 days Admelog 100 units/mL injectable solution: Sliding Scale   aspirin 81 mg oral tablet: 1 tab(s) orally once a day  atorvastatin 10 mg oral tablet: 1 tab(s) orally once a day  divalproex sodium 250 mg oral delayed release tablet: 1 tab(s) orally 2 times a day  folic acid 1 mg oral tablet: 1 tab(s) orally once a day  Keppra  mg oral tablet, extended release: 2 tab(s) orally 2 times a day  Melatonin 5 mg oral tablet: 1 tab(s) orally once a day (at bedtime)  metFORMIN 1000 mg oral tablet: 1 tab(s) orally 2 times a day  SEROquel 25 mg oral tablet: 1 tab(s) orally once a day (at bedtime)  traZODone: 50 milligram(s) orally once a day  traZODone 50 mg oral tablet: 1 tab(s) orally once a day   Vitamin D3 50,000 intl units (1250 mcg) oral capsule: 1 tab(s) orally every 7 days Admelog 100 units/mL injectable solution: Sliding Scale   aspirin 81 mg oral tablet: 1 tab(s) orally once a day  atorvastatin 10 mg oral tablet: 1 tab(s) orally once a day  divalproex sodium 250 mg oral delayed release tablet: 1 tab(s) orally 2 times a day  folic acid 1 mg oral tablet: 1 tab(s) orally once a day  Keppra  mg oral tablet, extended release: 2 tab(s) orally 2 times a day  Melatonin 5 mg oral tablet: 1 tab(s) orally once a day (at bedtime)  metFORMIN 1000 mg oral tablet: 1 tab(s) orally 2 times a day  SEROquel 25 mg oral tablet: 1 tab(s) orally once a day (at bedtime)  traZODone 50 mg oral tablet: 1 tab(s) orally once a day   Vitamin D3 50,000 intl units (1250 mcg) oral capsule: 1 tab(s) orally every 7 days

## 2020-05-27 NOTE — H&P ADULT - PROBLEM SELECTOR PLAN 2
-pH 7.31 in setting of DONA  -follow up repeat VBG -likely 2/2 mild uremia vs. underlying psychiatric illness  -CTH negative  -currently calm and cooperative, still tangential  -psych consult in AM

## 2020-05-27 NOTE — PROGRESS NOTE ADULT - PROBLEM SELECTOR PLAN 1
-likely pre-renal due to ATN possibly 2/2 lisinopril use and decreased PO intake from diarrhea  -U/A with hyaline casts and urine lytes showing pre-renal FeNa  -improvement in Cr s/p IVF  -patient is making urine, will check bladder scan for any retention  -f/u renal U/S  -monitor I/O strictly per NH, baseline Cr 0.79 (May 11 2020)  -likely pre-renal due to ATN possibly 2/2 lisinopril use and decreased PO intake from diarrhea  -associated w/ mild hyperkalemia, improved s/p IVF (will give PRN)  -U/A with hyaline casts and urine lytes showing pre-renal FeNa  -improvement in Cr s/p IVF- c/w IVF maintenance for 12 hrs  -patient is making urine, most recent bladder scan 5/26 showed 84 mL residual  -f/u renal U/S  -monitor I/O strictly

## 2020-05-27 NOTE — H&P ADULT - PROBLEM SELECTOR PLAN 5
-continue home Keppra  -no signs of seizure like activity  -check CK level -continue home medications - Seroquel and Depakote  -haldol PRN for agitation -on ISS and Metformin at nursing home  -continue ISS while inpatient -patient endorses diarrhea, which may be precipitating hypotension  -check GI PCR and C. diff (patient lives in nursing home)

## 2020-05-27 NOTE — BEHAVIORAL HEALTH ASSESSMENT NOTE - RISK ASSESSMENT
Low Acute Suicide Risk Chronic risk :Male gender, medical comorbidities, recent stroke  Protective factors : no concerns on suicidality, no past psychiatric h/o, no h/o substance abuse, supportive family

## 2020-05-27 NOTE — BEHAVIORAL HEALTH ASSESSMENT NOTE - HPI (INCLUDE ILLNESS QUALITY, SEVERITY, DURATION, TIMING, CONTEXT, MODIFYING FACTORS, ASSOCIATED SIGNS AND SYMPTOMS)
60 y old AA male, , retired, with no PPH as per daughter but h/o schizophrenia based on information obtained by primary team from [facility] from where patient was brought in to [facility] ED for agitation/confusion at NH. Patient has PMH of DM/HTN, seizure disorder and recent stroke 2 weeks ago for which patient was admitted at [facility] and was discharged to [facility].   Psychiatry is consulted by primary team for confusion/tangential speech and for psychiatric clearance for discharge to Nursing home.  CL team attempted to see patient using Zoom, due to COVID pandemic, in order to minimize spread and conserve PPE, however patient appears sedated and lethargic, opens eyes for few seconds on repeated verbal instructions but unable to sustain alertness and attention. When asked him to tell his full name, patient appears mumbling "Milton", otherwise does not respond to any further questions. When asked about his current location, he looks around in a confused manner but unable to answer.   As per information obtained from chart, patient had DONA 2/2 diarrhea?, received IV fluids with improvement in DONA. per team, patient was agitated in the ED and required Haldol, however has been calm now, A/O x 1-2 which is his baseline.  Per collaterals obtained from patient's daughter, patient has no prior psychiatric h/o, psychiatric  hospitalizations or h/o outpatient psychiatric treatments in the past. He also does not have any h/o suicidal or violent behaviour as well as no h/o substance abuse. Patient was a normal life with his wife, was able to do his ADL's independently without any signs of confusion or memory impairment until he had a stroke about 2 weeks ago which affected his balance, required hospitalization at [facility]. Patient has had worsening confusion after the stroke and was discharged for rehab at [facility]. At the nursing home, staff focussed mainly on his agitation and believed he had psychiatric issues, gave me psychotropic medication including Seroquel 25 mg po BID, Trazodone 25 mg po BID and Depakote 250 mg po BID, for which she is not happy about because she believes he was not being managed properly or provided proper redirection. 60 y old AA male, , retired, with no PPH as per daughter but h/o schizophrenia based on information obtained by primary team from [facility] from where patient was brought in to [facility] ED for agitation/confusion at NH. Patient has PMH of DM/HTN, seizure disorder and recent stroke 2 weeks ago for which patient was admitted at [facility] and was discharged to [facility].   Psychiatry is consulted by primary team for confusion/tangential speech and for psychiatric clearance for discharge to Nursing home.  CL team attempted to see patient using Zoom, due to COVID pandemic, in order to minimize spread and conserve PPE, however patient appears lethargic with eyes closed. He opens eyes for few seconds on repeated verbal instructions but unable to sustain alertness and attention. When asked about his full name, patient appears mumbling "Milton", otherwise does not respond to any questions. When asked about his current location, he looks around in a confused manner but unable to provide an answer.   As per information obtained from chart, patient had DONA 2/2 diarrhea?, received IV fluids with improvement in DONA. per team, patient was agitated in the ED and required Haldol, however has been calm now, A/O x 1-2 which is his baseline.  Per collaterals obtained from patient's daughter, patient has no prior psychiatric h/o, psychiatric  hospitalizations or h/o outpatient psychiatric treatments in the past. He also does not have any h/o suicidal or violent behaviour as well as no h/o substance abuse. Patient was a normal life with his wife, was able to do his ADL's independently without any signs of confusion or memory impairment until he had a stroke about 2 weeks ago which affected his balance, required hospitalization at [facility]. Patient has had worsening confusion after the stroke and was discharged for rehab at [facility]. At the nursing home, staff focussed mainly on his agitation and believed he had psychiatric issues, gave me psychotropic medication including Seroquel 25 mg po BID, Trazodone 25 mg po BID and Depakote 250 mg po BID, for which she is not happy about because she believes staff at NH has limited understanding of his condition as they ignore the fact that patient's confusion started after stroke incident while prior to that he has no psychiatric h/o. She informs that patient was combative with staff in the elevator but is not aware of causing physical harm to anyone.

## 2020-05-27 NOTE — DISCHARGE NOTE PROVIDER - HOSPITAL COURSE
59 yo M PMH seizure disorder, schizophrenia, HTN, HLD presenting from NH for agitation.  On Admission, found to have DONA with lactic acidosis that resolved after IVF and holding home lisinopril.  Had a renal ultrasound that was normal. Was placed on home seizure and schizophrenia medications and returned to baseline mental status.  Psychiatry saw patient and cleared him for discharge.  Now stable and suitable for discharge back to NH. 61 yo M PMH CVA HTN, HLD presenting from NH for agitation.  On Admission, found to have DONA with lactic acidosis that resolved after IVF and holding home lisinopril.  Had a renal ultrasound that was normal. Was placed on home seizure ppx medications and returned to baseline mental status.  Psychiatry saw patient and cleared him for discharge, adjusting his medications.  Now stable and suitable for discharge back to NH. 59 yo M PMH CVA HTN, HLD presenting from NH for agitation.  On Admission, found to have DONA with lactic acidosis that resolved after IVF and holding home lisinopril.  Had a renal ultrasound that was normal. Was placed on home seizure ppx medications and returned to baseline mental status.  Psychiatry saw patient and cleared him for discharge, adjusting his medications.  Now stable and suitable for discharge back to home. 59 yo M PMH recent CVA (2 weeks ago),HTN, HLD presenting from NH for agitation.  On Admission, found to have DONA with lactic acidosis that resolved after IVF and holding home lisinopril.  Had a renal ultrasound that was normal. Was placed on home seizure ppx medications and returned to baseline mental status.  Psychiatry saw patient and cleared him for discharge, adjusting his medications.  Now stable and suitable for discharge back to home. His Lisinopril will need to be resumed as an outpatient.

## 2020-05-27 NOTE — BEHAVIORAL HEALTH ASSESSMENT NOTE - NSBHCHARTREVIEWIMAGING_PSY_A_CORE FT
EXAM:  CT BRAIN        PROCEDURE DATE:  May 26 2020         INTERPRETATION:  CLINICAL INFORMATION: Altered mental status and agitation.    TECHNIQUE: Noncontrast axial CT images were acquired through the head. Two-dimensional sagittal and coronal reformats were generated.    COMPARISON STUDY: None available.    FINDINGS:     There is no CT evidence of acute intracranial hemorrhage, extra-axial collection, vasogenic edema, mass effect, midline shift, central herniation, or hydrocephalus.     There is mild cerebral volume loss and patchy white matter hypoattenuation which is nonspecific in etiology but likely related to microvascular disease. No acute territorial infarct.    The visualized paranasal sinuses and mastoid air cells are clear. The soft tissues of the scalp are unremarkable. The calvarium is intact.    IMPRESSION:     No acute intracranial hemorrhage, territorial infarct or mass effect.

## 2020-05-27 NOTE — H&P ADULT - NSHPPHYSICALEXAM_GEN_ALL_CORE
PHYSICAL EXAM:    GENERAL: No acute distress, well-developed  HEAD:  Atraumatic, Normocephalic  EYES: EOMI, PERRLA, conjunctiva and sclera clear  NECK: Supple, no lymphadenopathy, no JVD  CHEST/LUNG: CTAB; No wheezes, rales, or rhonchi  HEART: Regular rate and rhythm; No murmurs, rubs, or gallops  ABDOMEN: Soft, non-tender, non-distended; normal bowel sounds, no organomegaly  EXTREMITIES:  2+ peripheral pulses b/l, No clubbing, cyanosis, or edema  NEUROLOGY: answering questions, AO x 1, no neuro deficits   SKIN: No rashes or lesions

## 2020-05-27 NOTE — H&P ADULT - PROBLEM SELECTOR PLAN 4
-continue home medications - Seroquel and Depakote  -haldol PRN for agitation -7.5 (moderately hemolyzed) --> 4.8 s/p 1.5 L fluids  -s/p Calcium gluconate. EKG grossly unchanged  -given K normalization, hold insulin and lokelma -7.5 (moderately hemolyzed) --> 4.8 s/p 1.5 L fluids  -s/p Calcium gluconate. EKG grossly unchanged  -given K normalization, hold insulin and Lokelma

## 2020-05-27 NOTE — H&P ADULT - ATTENDING COMMENTS
60 M hx of schizophrenia, seizure, presenting from [facility] with a chief complaint of agitation. Unclear if suboptimal psychotropic regimen driving agitation or secondary cause such as infection, or hypovolemia (pt reporting episodes of diarrhea). Has not been agitated since ed arrival, or required stat psychotropic meds. SCr 2.26 improved after IVF. UA negative. renal duplex ordered. GI PCR/ cdiff ordered. Psychiatry to be called in am

## 2020-05-27 NOTE — PROGRESS NOTE ADULT - PROBLEM SELECTOR PLAN 2
-likely 2/2 mild uremia vs. underlying psychiatric illness  -CTH negative  -currently calm and cooperative, still tangential  -possible psych consult this AM -likely 2/2 mild uremia vs. underlying psychiatric illness  -CTH negative  -currently calm and cooperative, still tangential  -c/w home schizophrenia meds (seroquel and trazadone)  -f/u psych for d/c planning likely 2/2 mild uremia vs. underlying psychiatric illness  -CTH negative on admission  -currently calm and cooperative, still tangential  -c/w home schizophrenia meds (seroquel and trazadone)  -f/u psych for d/c planning

## 2020-05-27 NOTE — PROGRESS NOTE ADULT - ASSESSMENT
60 M hx of schizophrenia, seizure, presenting from [facility] with a chief complaint of agitation.

## 2020-05-27 NOTE — PROVIDER CONTACT NOTE (CRITICAL VALUE NOTIFICATION) - ASSESSMENT
[FreeTextEntry1] : Ms. Sanchez is a 53 yr old woman who was undergoing screening mammography every other year or so. Her screening mammogram on 1/22/19 showed architectural distortion in the left superior breast. Ultrasound examination showed a stable hypoechoic nodule in the right breast in the 9:00 retroareolar region. Diagnostic mammogram of the left breast on 1/28/19 showed a spiculated mass with pleomorphic microcalcifications in the left mid breast at 12:00, posterior depth, spanning up to 24 mm. There were additional clusters of calcifications in the left upper outer breast at an anterior and middle depth.\par \par Ultrasound-guided core biopsy of the left breast at 12:00, 3 cm from the nipple and revealed invasive moderately differentiated ductal carcinoma, Benzonia score 6/9, measuring at least 0.6 cm. DCIS with cribriform pattern and intermediate grade nuclear atypia was also seen. Biopsy of the left upper outer quadrant showed proliferative and non-proliferative fibrocystic disease. The tumor cells were ER 95% positive, HI 95% positive and HER-2/sarkis negative.\par \par She underwent breast MRI on 2/1/19 showing the biopsy-proven malignancy in the upper central left breast. There was also an enhancing focus in the upper outer posterior left breast and upper inner right breast for which MRI guided biopsy was recommended. MR guided core biopsy of the right breast revealed proliferative fibrocystic changes and other benign findings. Biopsy of the left upper outer breast revealed LCIS and proliferative fibrocystic change.\par \par On 3/7/19, she underwent left breast lumpectomy and sentinel lymph node biopsy. The pathology report showed multifocal invasive ductal carcinoma with the largest focus measuring 0.5 cm, overall grade 1. DCIS was present measuring 1.3 cm in size, cribriform and micropapillary architectural patterns and low to intermediate nuclear grade atypia. There was no evidence of an extensive intraductal component or LCIS. Margins were negative for the invasive component and there was one close margin for DCIS, the posterior margin measuring less than 0.1 cm. Five sentinel lymph nodes were negative for tumor. Oncotype DX recurrence score was 11.\par  Pt alert, confused resting in bed VS stable.    No signs or symptoms of acute distress noted

## 2020-05-27 NOTE — BEHAVIORAL HEALTH ASSESSMENT NOTE - DETAILS
Pt is non-verbal, however no concerns regarding suicidality expressed by daughter Patient was reportedly aggressive towards staff at Nursing home

## 2020-05-27 NOTE — DISCHARGE NOTE PROVIDER - CARE PROVIDER_API CALL
Sade Alfredo  INTERNAL MEDICINE  [address] [place] [address]  Phone: [phone]  Fax: [phone]  Follow Up Time:

## 2020-05-27 NOTE — H&P ADULT - NSHPLABSRESULTS_GEN_ALL_CORE
.  LABS:                         11.8   10.21 )-----------( 352      ( 26 May 2020 21:40 )             35.9         130<L>  |  93<L>  |  37<H>  ----------------------------<  121<H>  7.5<HH>   |  18<L>  |  2.26<H>    Ca    9.8      26 May 2020 21:40    TPro  8.9<H>  /  Alb  4.5  /  TBili  0.4  /  DBili  x   /  AST  36  /  ALT  12  /  AlkPhos  33<L>        Urinalysis Basic - ( 27 May 2020 00:25 )    Color: YELLOW / Appearance: CLEAR / S.020 / pH: 5.5  Gluc: NEGATIVE / Ketone: NEGATIVE  / Bili: NEGATIVE / Urobili: TRACE   Blood: NEGATIVE / Protein: 20 / Nitrite: NEGATIVE   Leuk Esterase: NEGATIVE / RBC: 3-5 / WBC 0-2   Sq Epi: OCC / Non Sq Epi: x / Bacteria: NEGATIVE      RADIOLOGY, EKG & ADDITIONAL TESTS: Reviewed.     < from: Xray Chest 1 View- PORTABLE-Urgent (20 @ 21:46) >    INTERPRETATION:  no emergent findings.    follow up official report in AM    < end of copied text >

## 2020-05-27 NOTE — H&P ADULT - PROBLEM SELECTOR PLAN 7
Transitions of Care Status:  1.  Name of PCP:  2.  PCP Contacted on Admission: [ ] Y    [ ] N    3.  PCP contacted at Discharge: [ ] Y    [ ] N    [ ] N/A  4.  Post-Discharge Appointment Date and Location:  5.  Summary of Handoff given to PCP: DVT ppx: heparin subq  Diet: Renal restrictions  Dispo: pending medical clearance -continue home Keppra  -no signs of seizure like activity  -check CK level -continue home medications - Seroquel and Depakote  -haldol PRN for agitation

## 2020-05-27 NOTE — PROGRESS NOTE ADULT - PROBLEM SELECTOR PLAN 3
-pH 7.31 in setting of DONA  -repeat VBG showing pH 7.31, however DONA is resolving  -f/u repeat VBG   -if no improvement in acidosis, consider nephrology consult -pH 7.31 in setting of DONA  -will f/u repeat VBG today pH 7.31 in setting of DONA and hypvolemia  -will f/u repeat VBG today  -s/p IVF, will give PRN is acidosis worsening

## 2020-05-27 NOTE — H&P ADULT - PROBLEM SELECTOR PROBLEM 6
Preventative health care Seizure Schizophrenia Type 2 diabetes mellitus without complication, without long-term current use of insulin

## 2020-05-27 NOTE — BEHAVIORAL HEALTH ASSESSMENT NOTE - SUMMARY
60 y old AA male, , retired, with no PPH as per daughter but h/o schizophrenia based on information obtained by primary team from [facility] from where patient was brought in to [facility] ED for agitation/confusion at NH. Patient has PMH of DM/HTN, seizure disorder and recent stroke 2 weeks ago for which patient was admitted at [facility] and was discharged to [facility].   Psychiatry is consulted by primary team for confusion/tangential speech and for psychiatric clearance for discharge to Nursing home.  Patient appears confused and lethargic during evaluation. He opens his eyes briefly and was able to state his first name in a mumbling tone, but unable to participate in further assessment. No rigidity noted on exam. Does not look internally preoccupied or responding to internal stimuli. As per daughter, his confusion started after he had stroke 2 weeks ago bit prior to that he had no psychiatric h/o, no h/o violent, psychotic or suicidal behavior.  Based on above information, patient's symptoms are consistent with delirium in context of medical condition (Recent stroke, DONA, diarrhea). We recommend;  1-Seroquel 25 mg po bedtime (Discontinue am dose as pt looked sedated during evaluation)  2-Trazodone 25 mg po bedtime (D/C am dose)  3-Serqouel 12.5 mg po q 6 hr prn agitation   4-Plan to increase dose of standing Seroquel if patient requiring a lot of PRN;s   5- Melatonin 5 mg po bedtime  6-Continue Depakote 250 mg po BID  7-Hold psychotropic medication, if QTC >500 ms  8-Behaviorial redirection

## 2020-05-27 NOTE — H&P ADULT - ASSESSMENT
60 M hx of schizophrenia, seizure, presenting from [facility] with a chief complaint of agitation.

## 2020-05-27 NOTE — PROGRESS NOTE ADULT - PROBLEM SELECTOR PLAN 6
-on ISS and Metformin at nursing home  -continue ISS while inpatient -continue home Keppra and depakote  -no signs of seizure like activity  - -continue home Keppra and depakote  -no signs of seizure like activity  - on admission

## 2020-05-27 NOTE — H&P ADULT - PROBLEM SELECTOR PLAN 9
Transitions of Care Status:  1.  Name of PCP:  2.  PCP Contacted on Admission: [ ] Y    [ ] N    3.  PCP contacted at Discharge: [ ] Y    [ ] N    [ ] N/A  4.  Post-Discharge Appointment Date and Location:  5.  Summary of Handoff given to PCP: DVT ppx: heparin subq  Diet: Renal restrictions  Dispo: pending medical clearance

## 2020-05-27 NOTE — BEHAVIORAL HEALTH ASSESSMENT NOTE - NSBHCONSULTMEDS_PSY_A_CORE FT
1-Seroquel 25 mg po bedtime (Discontinue am dose as pt looked sedated during evaluation)  2-Trazodone 25 mg po bedtime (D/C am dose)  3-Serqouel 12.5 mg po q 6 hr prn agitation   4-Plan to increase dose of standing Seroquel if patient requiring a lot of PRN;s   5- Melatonin 5 mg po bedtime  6-Continue Depakote 250 mg po BID  7-Hold psychotropic medication, if QTC >500 ms  8-Behaviorial redirection

## 2020-05-27 NOTE — PROGRESS NOTE ADULT - PROBLEM SELECTOR PLAN 7
-continue home medications - Seroquel and Depakote  -haldol PRN for agitation -holding home lisinopril in setting of DONA and low bp on admission -holding home lisinopril in setting of DONA and low bp on admission (per NH, usually bp is 130s/80s)

## 2020-05-27 NOTE — H&P ADULT - NSHPREVIEWOFSYSTEMS_GEN_ALL_CORE
REVIEW OF SYSTEMS:    CONSTITUTIONAL: No weakness, fevers or chills  EYES/ENT: No visual changes;  No vertigo or throat pain   NECK: No pain or stiffness  RESPIRATORY: No cough, wheezing, hemoptysis; No shortness of breath  CARDIOVASCULAR: No chest pain or palpitations  GASTROINTESTINAL: No abdominal or epigastric pain. No nausea, vomiting, or hematemesis; (+) diarrhea or constipation. No melena or hematochezia.  BACK: No CVA tenderness  GENITOURINARY: No dysuria, frequency or hematuria  NEUROLOGICAL: No numbness or weakness  SKIN: No itching, rashes

## 2020-05-27 NOTE — PROGRESS NOTE ADULT - ATTENDING COMMENTS
Pt seen and examined on rounds w/ team.  Pt is very poor historian, likely limited by underlying psychiatric disorder.   No agitation since admission.  Endorsed diarrhea on admission, stool studies ordered, but no episodes of diarrhea since admission per RN.   Will obtain Psych c/s to assist with safe discharge plan.  DONA improving with IVF, repeat VBG pending to f/u lactate.   No signs of infectious etiology.

## 2020-05-27 NOTE — H&P ADULT - PROBLEM SELECTOR PLAN 1
-likely pre-renal due to ATN possibly 2/2 lisinopril use and decreased PO intake   -U/A with hyaline casts and urine lytes showing pre-renal FeNa  -improvement in Cr 2.92 --> 1.62 with 1.5L IVF  -continue IVF at 75 cc/hr for 12 hours and repeat BMP in AM  -patient is making urine, will check bladder scan for any retention -likely pre-renal due to ATN possibly 2/2 lisinopril use and decreased PO intake from diarrhea  -U/A with hyaline casts and urine lytes showing pre-renal FeNa  -improvement in Cr 2.92 --> 1.62 with 1.5L IVF  -continue IVF at 75 cc/hr for 12 hours and repeat BMP in AM  -patient is making urine, will check bladder scan for any retention  -obtain renal U/S

## 2020-05-27 NOTE — CHART NOTE - NSCHARTNOTEFT_GEN_A_CORE
Spoke with Psychiatry Attending Dr. Morillo confirming that patient has no underlying psychiatric disorder.    Recs:  - consider delirium work-up for possible underlying infection

## 2020-05-27 NOTE — BEHAVIORAL HEALTH ASSESSMENT NOTE - CASE SUMMARY
Chart reviewed. I personally saw the patient using a video platform this AM with Dr. Colon. I then saw the patient physically in the afternoon.  In the afternoon he was more alert, with normal speech latency, volume, rate and tone. He said he felt good and had a euthymic affect. He was upset about his food. He had some paranoia that people were taking his coffee at his place. 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 He did not know the date. He denied SI/HI, denied AH/VH.  Of note, to our knowledge based on collateral this person does not have a psychiatric diagnosis of a primary psychotic disorder. He currently is delirious.  Agree with Dr. Colon's recs per above. May also consider repeat testing for COVID and further neuro workup given his neurologic history. Call with questions. No psych contraindication to discharge.

## 2020-05-27 NOTE — H&P ADULT - PROBLEM SELECTOR PLAN 6
DVT ppx: heparin subq  Diet: Renal restrictions  Dispo: pending medical clearance -continue home Keppra  -no signs of seizure like activity  -check CK level -continue home medications - Seroquel and Depakote  -haldol PRN for agitation -on ISS and Metformin at nursing home  -continue ISS while inpatient

## 2020-05-27 NOTE — H&P ADULT - HISTORY OF PRESENT ILLNESS
60 M hx of schizophrenia, seizure, presenting from [facility] with a cc of agitation. Per transfer note, patient got agitated and hostile towards nursing home staff. Patient was given haldol 5 mg x 1 without improvement. Upon interview, patient calm and cooperative, tangential and unable to recall today's events. He is answering questions, but is AO x 1-2. He is a poor historian and was unclear why he was in the ED. Patient denies chest pain, abdominal pain, N/V, SOB, constipation. Does endorse two days of diarrhea.    In the ED,   VS T 97.6; ; BP 84//61; RR 17; SpO2 100 RA  Labs Hgb 11.8; Na 130; K 7.5 (moderately hemolyzed); CO2 18; AG 19; BUN/Cr 37/2.26; Lactate 4; CTH negative  Given 1.5 L NS bolus; Ca gluconate

## 2020-05-27 NOTE — PROGRESS NOTE ADULT - PROBLEM SELECTOR PLAN 4
-7.5 (moderately hemolyzed) --> 4.8 s/p 1.5 L fluids  -s/p Calcium gluconate. EKG grossly unchanged -patient endorses diarrhea, which may be precipitating hypotension  -monitor stool count  -check GI PCR and C. diff (patient lives in nursing home) patient endorses diarrhea, which may be precipitating hypotension  -monitor stool count  -check GI PCR and C. diff if diarrhea persists

## 2020-05-27 NOTE — DISCHARGE NOTE PROVIDER - NSDCFUADDAPPT_GEN_ALL_CORE_FT
-please follow up with your primary care doctor following discharge  -please follow up with your psychiatrist following discharge -please make an appointment follow up with your primary care doctor following discharge

## 2020-05-27 NOTE — H&P ADULT - PROBLEM SELECTOR PROBLEM 5
Seizure Schizophrenia Type 2 diabetes mellitus without complication, without long-term current use of insulin Diarrhea

## 2020-05-27 NOTE — PROGRESS NOTE ADULT - SUBJECTIVE AND OBJECTIVE BOX
Authored by Dr Arya Richards [phone] [facility]/ [id number] [facility]    Patient is a 60y old  Male who presents with a chief complaint of Agitation (27 May 2020 01:10)    SUBJECTIVE / OVERNIGHT EVENTS:    No acute events overnight.  Continues to endorse diarrhea.  Denies CP, SOB, fever/chills, N/V, abdominal pain.    REVIEW OF SYSTEMS:    CONSTITUTIONAL: Denies weakness, fevers, chills  EYES/ENT: Denies visual changes, vertigo, throat pain   NECK: Denies pain, stiffness  RESPIRATORY: Denies cough, wheezing, hemoptysis; Denies shortness of breath  CARDIOVASCULAR: Denies chest pain or palpitations  GASTROINTESTINAL: Denies abdominal or epigastric pain. Denies nausea, vomiting, hematemesis, constipation, melena, or hematochezia  GENITOURINARY: Denies dysuria, frequency or hematuria  NEUROLOGICAL: Denies numbness or weakness  SKIN: Denies itching, rashes      MEDICATIONS  (STANDING):  aspirin  chewable 81 milliGRAM(s) Oral daily  atorvastatin 10 milliGRAM(s) Oral at bedtime  dextrose 5%. 1000 milliLiter(s) (50 mL/Hr) IV Continuous <Continuous>  dextrose 50% Injectable 12.5 Gram(s) IV Push once  dextrose 50% Injectable 25 Gram(s) IV Push once  dextrose 50% Injectable 25 Gram(s) IV Push once  diVALproex  milliGRAM(s) Oral two times a day  folic acid 1 milliGRAM(s) Oral daily  heparin   Injectable 5000 Unit(s) SubCutaneous every 12 hours  insulin lispro (HumaLOG) corrective regimen sliding scale   SubCutaneous three times a day before meals  insulin lispro (HumaLOG) corrective regimen sliding scale   SubCutaneous at bedtime  levETIRAcetam 1000 milliGRAM(s) Oral two times a day  melatonin 6 milliGRAM(s) Oral at bedtime  QUEtiapine 25 milliGRAM(s) Oral two times a day  sodium chloride 0.9%. 1000 milliLiter(s) (75 mL/Hr) IV Continuous <Continuous>  traZODone 25 milliGRAM(s) Oral two times a day    MEDICATIONS  (PRN):  dextrose 40% Gel 15 Gram(s) Oral once PRN Blood Glucose LESS THAN 70 milliGRAM(s)/deciliter  glucagon  Injectable 1 milliGRAM(s) IntraMuscular once PRN Glucose LESS THAN 70 milligrams/deciliter      Vital Signs Last 24 Hrs  T(C): 36.6 (27 May 2020 06:06), Max: 36.8 (26 May 2020 20:49)  T(F): 97.9 (27 May 2020 06:06), Max: 98.3 (26 May 2020 20:49)  HR: 87 (27 May 2020 06:06) (87 - 115)  BP: 99/71 (27 May 2020 06:06) (84/61 - 106/61)  BP(mean): --  RR: 16 (27 May 2020 06:06) (15 - 18)  SpO2: 99% (27 May 2020 06:06) (99% - 100%)  CAPILLARY BLOOD GLUCOSE      POCT Blood Glucose.: 90 mg/dL (27 May 2020 08:39)  POCT Blood Glucose.: 91 mg/dL (27 May 2020 01:02)  POCT Blood Glucose.: 125 mg/dL (26 May 2020 22:02)    I&O's Summary      PHYSICAL EXAM  GENERAL: NAD, well-developed  EYES: conjunctiva and sclera clear  NECK: Supple, No JVD  ENT: MMM  CHEST/LUNG: Clear to auscultation bilaterally; No wheeze  HEART: Regular rate and rhythm; No murmurs  ABDOMEN: Soft, Nontender, Nondistended; Bowel sounds present  EXTREMITIES:  2+ Peripheral Pulses, No clubbing, cyanosis, or edema  NEURO: nonfocal, AOx1-2  PSYCH: calm   SKIN: No rashes or lesions    LABS:                        10.4   6.81  )-----------( 305      ( 27 May 2020 07:20 )             31.8         134<L>  |  100  |  33<H>  ----------------------------<  91  5.0   |  19<L>  |  1.73<H>    Ca    9.4      27 May 2020 07:20  Phos  5.3     -  Mg     1.2         TPro  8.9<H>  /  Alb  4.5  /  TBili  0.4  /  DBili  x   /  AST  36  /  ALT  12  /  AlkPhos  33<L>  05-26      CARDIAC MARKERS ( 27 May 2020 07:20 )  x     / x     / 436 u/L / x     / x          Urinalysis Basic - ( 27 May 2020 00:25 )    Color: YELLOW / Appearance: CLEAR / S.020 / pH: 5.5  Gluc: NEGATIVE / Ketone: NEGATIVE  / Bili: NEGATIVE / Urobili: TRACE   Blood: NEGATIVE / Protein: 20 / Nitrite: NEGATIVE   Leuk Esterase: NEGATIVE / RBC: 3-5 / WBC 0-2   Sq Epi: OCC / Non Sq Epi: x / Bacteria: NEGATIVE          RADIOLOGY & ADDITIONAL TESTS:    Imaging Personally Reviewed:  Consultant(s) Notes Reviewed:    Care Discussed with Consultants/Other Providers:

## 2020-05-27 NOTE — PROGRESS NOTE ADULT - PROBLEM SELECTOR PLAN 5
-patient endorses diarrhea, which may be precipitating hypotension  -check GI PCR and C. diff (patient lives in nursing home) -on ISS and Metformin at nursing home  -continue ISS while inpatient  -f/u A1c on ISS and Metformin at nursing home  -continue ISS while inpatient  -f/u A1c

## 2020-05-27 NOTE — BEHAVIORAL HEALTH ASSESSMENT NOTE - OTHER
Daughter peers Eyes were closed mostly unpredictable in setting of confusion unable to assess non-spontaneous withdrawn unable to assess, does not look internally preoccupied or responding to internal stimuli Recent stroke, DONA 2/2 diarrhea

## 2020-05-28 LAB
ANION GAP SERPL CALC-SCNC: 11 MMO/L — SIGNIFICANT CHANGE UP (ref 7–14)
BASE EXCESS BLDV CALC-SCNC: -0.7 MMOL/L — SIGNIFICANT CHANGE UP
BUN SERPL-MCNC: 22 MG/DL — SIGNIFICANT CHANGE UP (ref 7–23)
CALCIUM SERPL-MCNC: 9.3 MG/DL — SIGNIFICANT CHANGE UP (ref 8.4–10.5)
CHLORIDE SERPL-SCNC: 104 MMOL/L — SIGNIFICANT CHANGE UP (ref 98–107)
CO2 SERPL-SCNC: 20 MMOL/L — LOW (ref 22–31)
CREAT SERPL-MCNC: 0.93 MG/DL — SIGNIFICANT CHANGE UP (ref 0.5–1.3)
GAS PNL BLDV: 137 MMOL/L — SIGNIFICANT CHANGE UP (ref 136–146)
GLUCOSE BLDV-MCNC: 119 MG/DL — HIGH (ref 70–99)
GLUCOSE SERPL-MCNC: 120 MG/DL — HIGH (ref 70–99)
HBA1C BLD-MCNC: 7.9 % — HIGH (ref 4–5.6)
HCO3 BLDV-SCNC: 24 MMOL/L — SIGNIFICANT CHANGE UP (ref 20–27)
HCT VFR BLD CALC: 30.4 % — LOW (ref 39–50)
HCT VFR BLDV CALC: 32.9 % — LOW (ref 39–51)
HCV AB S/CO SERPL IA: 0.13 S/CO — SIGNIFICANT CHANGE UP (ref 0–0.99)
HCV AB SERPL-IMP: SIGNIFICANT CHANGE UP
HGB BLD-MCNC: 10.1 G/DL — LOW (ref 13–17)
HGB BLDV-MCNC: 10.6 G/DL — LOW (ref 13–17)
MAGNESIUM SERPL-MCNC: 1.2 MG/DL — LOW (ref 1.6–2.6)
MCHC RBC-ENTMCNC: 29.9 PG — SIGNIFICANT CHANGE UP (ref 27–34)
MCHC RBC-ENTMCNC: 33.2 % — SIGNIFICANT CHANGE UP (ref 32–36)
MCV RBC AUTO: 89.9 FL — SIGNIFICANT CHANGE UP (ref 80–100)
NRBC # FLD: 0 K/UL — SIGNIFICANT CHANGE UP (ref 0–0)
PCO2 BLDV: 38 MMHG — LOW (ref 41–51)
PH BLDV: 7.4 PH — SIGNIFICANT CHANGE UP (ref 7.32–7.43)
PHOSPHATE SERPL-MCNC: 3.8 MG/DL — SIGNIFICANT CHANGE UP (ref 2.5–4.5)
PLATELET # BLD AUTO: 313 K/UL — SIGNIFICANT CHANGE UP (ref 150–400)
PMV BLD: 8.6 FL — SIGNIFICANT CHANGE UP (ref 7–13)
PO2 BLDV: 43 MMHG — HIGH (ref 35–40)
POTASSIUM BLDV-SCNC: 4.8 MMOL/L — HIGH (ref 3.4–4.5)
POTASSIUM SERPL-MCNC: 4.8 MMOL/L — SIGNIFICANT CHANGE UP (ref 3.5–5.3)
POTASSIUM SERPL-SCNC: 4.8 MMOL/L — SIGNIFICANT CHANGE UP (ref 3.5–5.3)
RBC # BLD: 3.38 M/UL — LOW (ref 4.2–5.8)
RBC # FLD: 13.1 % — SIGNIFICANT CHANGE UP (ref 10.3–14.5)
SAO2 % BLDV: 73 % — SIGNIFICANT CHANGE UP (ref 60–85)
SODIUM SERPL-SCNC: 135 MMOL/L — SIGNIFICANT CHANGE UP (ref 135–145)
WBC # BLD: 5.89 K/UL — SIGNIFICANT CHANGE UP (ref 3.8–10.5)
WBC # FLD AUTO: 5.89 K/UL — SIGNIFICANT CHANGE UP (ref 3.8–10.5)

## 2020-05-28 PROCEDURE — 99233 SBSQ HOSP IP/OBS HIGH 50: CPT | Mod: GC

## 2020-05-28 RX ORDER — TRAZODONE HCL 50 MG
50 TABLET ORAL
Qty: 0 | Refills: 0 | DISCHARGE
Start: 2020-05-28

## 2020-05-28 RX ORDER — QUETIAPINE FUMARATE 200 MG/1
1 TABLET, FILM COATED ORAL
Qty: 0 | Refills: 0 | DISCHARGE
Start: 2020-05-28

## 2020-05-28 RX ORDER — QUETIAPINE FUMARATE 200 MG/1
12.5 TABLET, FILM COATED ORAL EVERY 6 HOURS
Refills: 0 | Status: DISCONTINUED | OUTPATIENT
Start: 2020-05-28 | End: 2020-05-29

## 2020-05-28 RX ORDER — MAGNESIUM SULFATE 500 MG/ML
2 VIAL (ML) INJECTION ONCE
Refills: 0 | Status: COMPLETED | OUTPATIENT
Start: 2020-05-28 | End: 2020-05-28

## 2020-05-28 RX ADMIN — LEVETIRACETAM 1000 MILLIGRAM(S): 250 TABLET, FILM COATED ORAL at 04:31

## 2020-05-28 RX ADMIN — HEPARIN SODIUM 5000 UNIT(S): 5000 INJECTION INTRAVENOUS; SUBCUTANEOUS at 04:31

## 2020-05-28 RX ADMIN — ATORVASTATIN CALCIUM 10 MILLIGRAM(S): 80 TABLET, FILM COATED ORAL at 20:43

## 2020-05-28 RX ADMIN — QUETIAPINE FUMARATE 25 MILLIGRAM(S): 200 TABLET, FILM COATED ORAL at 20:43

## 2020-05-28 RX ADMIN — Medication 1 MILLIGRAM(S): at 12:09

## 2020-05-28 RX ADMIN — LEVETIRACETAM 1000 MILLIGRAM(S): 250 TABLET, FILM COATED ORAL at 17:24

## 2020-05-28 RX ADMIN — SODIUM CHLORIDE 100 MILLILITER(S): 9 INJECTION INTRAMUSCULAR; INTRAVENOUS; SUBCUTANEOUS at 00:33

## 2020-05-28 RX ADMIN — Medication 81 MILLIGRAM(S): at 12:09

## 2020-05-28 RX ADMIN — Medication 6 MILLIGRAM(S): at 20:43

## 2020-05-28 RX ADMIN — Medication 25 MILLIGRAM(S): at 20:43

## 2020-05-28 RX ADMIN — QUETIAPINE FUMARATE 12.5 MILLIGRAM(S): 200 TABLET, FILM COATED ORAL at 16:38

## 2020-05-28 RX ADMIN — DIVALPROEX SODIUM 250 MILLIGRAM(S): 500 TABLET, DELAYED RELEASE ORAL at 04:31

## 2020-05-28 RX ADMIN — DIVALPROEX SODIUM 250 MILLIGRAM(S): 500 TABLET, DELAYED RELEASE ORAL at 17:24

## 2020-05-28 RX ADMIN — Medication 50 GRAM(S): at 09:48

## 2020-05-28 RX ADMIN — Medication 2: at 12:17

## 2020-05-28 NOTE — PHYSICAL THERAPY INITIAL EVALUATION ADULT - PERTINENT HX OF CURRENT PROBLEM, REHAB EVAL
This is a 60 M hx of schizophrenia, seizure, presenting from [facility] with a chief complaint of agitation.

## 2020-05-28 NOTE — PROGRESS NOTE ADULT - ATTENDING COMMENTS
Collateral received from family by Psychiatry team revealed that pt has no underlying psych disorder, had recent DVA and was sent to NH   DONA resolving w/ IVF hydration  BP improving back to baseline  No evidence of diarrhea  Medically stable for dc planning -> PT eval recs Rehab -> CM will provide family with list of new Rehabs facilities. Collateral received from family by Psychiatry team revealed that pt has no underlying psych disorder, had recent CVA and was sent to NH   DONA resolving w/ IVF hydration  BP improving back to baseline  No evidence of diarrhea  Medically stable for dc planning -> PT eval recs Rehab -> CM will provide family with list of new Rehabs facilities.

## 2020-05-28 NOTE — PROGRESS NOTE ADULT - PROBLEM SELECTOR PLAN 7
-holding home lisinopril in setting of DONA and low bp on admission (per NH, usually bp is 130s/80s)

## 2020-05-28 NOTE — PROGRESS NOTE ADULT - PROBLEM SELECTOR PLAN 2
likely 2/2 recent CVA  -CTH negative on admission  -currently calm and cooperative, still tangential and disorient  -psych following- no Hx of psychiatric illness prior to CVA 2 weeks ago --> c/w seroquel and trazadone  -cleared by psych for d/c

## 2020-05-28 NOTE — PROGRESS NOTE ADULT - ASSESSMENT
60 M hx of recent CVA, HLD, HTN, DM2 presenting from [facility] with a chief complaint of agitation, found to have DONA on admission.

## 2020-05-28 NOTE — PROGRESS NOTE ADULT - PROBLEM SELECTOR PLAN 6
seizure ppx s/p CVA  -continue home Keppra and depakote  -no signs of seizure like activity  - on admission

## 2020-05-28 NOTE — PHYSICAL THERAPY INITIAL EVALUATION ADULT - LIVES WITH, PROFILE
spouse/came from [facility] upon this admission as pt went to Rehab there for the past 2 wks s/p Stroke as per LUCA Davila

## 2020-05-28 NOTE — PROGRESS NOTE ADULT - PROBLEM SELECTOR PLAN 3
pH 7.31 in setting of DONA and hypvolemia on admission, now normalized  -most recent pH 7.4 on 5/28  -s/p IVF, will give PRN is acidosis worsening

## 2020-05-28 NOTE — PROGRESS NOTE ADULT - PROBLEM SELECTOR PLAN 1
per NH, baseline Cr 0.79 (May 11 2020)  -likely pre-renal due to ATN possibly 2/2 lisinopril use and decreased PO  -now resolving s/p IVF  -associated w/ mild hyperkalemia, improved s/p IVF (will give PRN)  -U/A with hyaline casts and urine lytes showing pre-renal FeNa  -patient is making urine, most recent bladder scan 5/26 showed 84 mL residual  -renal US w/ no acute pathology  -monitor I/O strictly

## 2020-05-28 NOTE — PROGRESS NOTE ADULT - SUBJECTIVE AND OBJECTIVE BOX
Authored by Dr Arya Richards [phone] [facility]/ [id number] [facility]    Patient is a 60y old  Male who presents with a chief complaint of Agitation (27 May 2020 13:04)    SUBJECTIVE / OVERNIGHT EVENTS:    No acute events overnight.  Patient difficult historian, however denies CP, SOB, fever/chills, N/V, diarrhea.  Still with no BM per RN.    REVIEW OF SYSTEMS:    CONSTITUTIONAL: Denies weakness, fevers, chills  EYES/ENT: Denies visual changes, vertigo, throat pain   NECK: Denies pain, stiffness  RESPIRATORY: Denies cough, wheezing, hemoptysis; Denies shortness of breath  CARDIOVASCULAR: Denies chest pain or palpitations  GASTROINTESTINAL: Denies abdominal or epigastric pain. Denies nausea, vomiting, hematemesis, diarrhea, constipation, melena, or hematochezia  GENITOURINARY: Denies dysuria, frequency or hematuria  NEUROLOGICAL: Denies numbness or weakness  SKIN: Denies itching, rashes      MEDICATIONS  (STANDING):  aspirin  chewable 81 milliGRAM(s) Oral daily  atorvastatin 10 milliGRAM(s) Oral at bedtime  dextrose 5%. 1000 milliLiter(s) (50 mL/Hr) IV Continuous <Continuous>  dextrose 50% Injectable 12.5 Gram(s) IV Push once  dextrose 50% Injectable 25 Gram(s) IV Push once  dextrose 50% Injectable 25 Gram(s) IV Push once  diVALproex  milliGRAM(s) Oral two times a day  folic acid 1 milliGRAM(s) Oral daily  heparin   Injectable 5000 Unit(s) SubCutaneous every 12 hours  insulin lispro (HumaLOG) corrective regimen sliding scale   SubCutaneous three times a day before meals  insulin lispro (HumaLOG) corrective regimen sliding scale   SubCutaneous at bedtime  levETIRAcetam 1000 milliGRAM(s) Oral two times a day  magnesium sulfate  IVPB 2 Gram(s) IV Intermittent once  melatonin 6 milliGRAM(s) Oral at bedtime  QUEtiapine 25 milliGRAM(s) Oral at bedtime  sodium chloride 0.9%. 1000 milliLiter(s) (100 mL/Hr) IV Continuous <Continuous>  traZODone 25 milliGRAM(s) Oral at bedtime    MEDICATIONS  (PRN):  dextrose 40% Gel 15 Gram(s) Oral once PRN Blood Glucose LESS THAN 70 milliGRAM(s)/deciliter  glucagon  Injectable 1 milliGRAM(s) IntraMuscular once PRN Glucose LESS THAN 70 milligrams/deciliter  QUEtiapine 25 milliGRAM(s) Oral every 6 hours PRN Agitation      Vital Signs Last 24 Hrs  T(C): 37 (28 May 2020 04:08), Max: 37 (28 May 2020 04:08)  T(F): 98.6 (28 May 2020 04:08), Max: 98.6 (28 May 2020 04:08)  HR: 88 (28 May 2020 04:08) (88 - 94)  BP: 116/69 (28 May 2020 04:08) (103/66 - 130/84)  BP(mean): --  RR: 20 (28 May 2020 04:08) (17 - 20)  SpO2: 100% (28 May 2020 04:08) (100% - 100%)  CAPILLARY BLOOD GLUCOSE      POCT Blood Glucose.: 141 mg/dL (27 May 2020 22:21)  POCT Blood Glucose.: 161 mg/dL (27 May 2020 17:18)  POCT Blood Glucose.: 131 mg/dL (27 May 2020 12:34)  POCT Blood Glucose.: 90 mg/dL (27 May 2020 08:39)    I&O's Summary      PHYSICAL EXAM  GENERAL: NAD, well-developed  EYES: conjunctiva and sclera clear  NECK: Supple, No JVD  ENT: MMM  CHEST/LUNG: Clear to auscultation bilaterally; No wheeze  HEART: Regular rate and rhythm; No murmurs  ABDOMEN: Soft, Nontender, Nondistended; Bowel sounds present  EXTREMITIES:  2+ Peripheral Pulses, No clubbing, cyanosis, or edema  NEURO: nonfocal, AOx1-2  PSYCH: calm; disoriented, tangential (unchanged from yesterday)  SKIN: No rashes or lesions    LABS:                        10.1   5.89  )-----------( 313      ( 28 May 2020 05:30 )             30.4     05-28    135  |  104  |  22  ----------------------------<  120<H>  4.8   |  20<L>  |  0.93    Ca    9.3      28 May 2020 05:30  Phos  3.8     05-28  Mg     1.2     -28    TPro  8.9<H>  /  Alb  4.5  /  TBili  0.4  /  DBili  x   /  AST  36  /  ALT  12  /  AlkPhos  33<L>  05-26      CARDIAC MARKERS ( 27 May 2020 07:20 )  x     / x     / 436 u/L / x     / x          Urinalysis Basic - ( 27 May 2020 00:25 )    Color: YELLOW / Appearance: CLEAR / S.020 / pH: 5.5  Gluc: NEGATIVE / Ketone: NEGATIVE  / Bili: NEGATIVE / Urobili: TRACE   Blood: NEGATIVE / Protein: 20 / Nitrite: NEGATIVE   Leuk Esterase: NEGATIVE / RBC: 3-5 / WBC 0-2   Sq Epi: OCC / Non Sq Epi: x / Bacteria: NEGATIVE        Culture - Urine (collected 27 May 2020 00:49)  Source: .Urine Clean Catch (Midstream)  Final Report (27 May 2020 21:08):    <10,000 CFU/mL Normal Urogenital Sherrell        RADIOLOGY & ADDITIONAL TESTS:    Imaging Personally Reviewed:  Consultant(s) Notes Reviewed:    Care Discussed with Consultants/Other Providers:

## 2020-05-29 LAB
ANION GAP SERPL CALC-SCNC: 11 MMO/L — SIGNIFICANT CHANGE UP (ref 7–14)
BUN SERPL-MCNC: 14 MG/DL — SIGNIFICANT CHANGE UP (ref 7–23)
CALCIUM SERPL-MCNC: 9.7 MG/DL — SIGNIFICANT CHANGE UP (ref 8.4–10.5)
CHLORIDE SERPL-SCNC: 101 MMOL/L — SIGNIFICANT CHANGE UP (ref 98–107)
CO2 SERPL-SCNC: 22 MMOL/L — SIGNIFICANT CHANGE UP (ref 22–31)
CREAT SERPL-MCNC: 0.74 MG/DL — SIGNIFICANT CHANGE UP (ref 0.5–1.3)
GLUCOSE SERPL-MCNC: 142 MG/DL — HIGH (ref 70–99)
HCT VFR BLD CALC: 31.6 % — LOW (ref 39–50)
HGB BLD-MCNC: 10.4 G/DL — LOW (ref 13–17)
LACTATE BLDV-MCNC: 2.2 MMOL/L — HIGH (ref 0.5–2)
MAGNESIUM SERPL-MCNC: 1.3 MG/DL — LOW (ref 1.6–2.6)
MCHC RBC-ENTMCNC: 29.5 PG — SIGNIFICANT CHANGE UP (ref 27–34)
MCHC RBC-ENTMCNC: 32.9 % — SIGNIFICANT CHANGE UP (ref 32–36)
MCV RBC AUTO: 89.5 FL — SIGNIFICANT CHANGE UP (ref 80–100)
NRBC # FLD: 0 K/UL — SIGNIFICANT CHANGE UP (ref 0–0)
PHOSPHATE SERPL-MCNC: 3.4 MG/DL — SIGNIFICANT CHANGE UP (ref 2.5–4.5)
PLATELET # BLD AUTO: 323 K/UL — SIGNIFICANT CHANGE UP (ref 150–400)
PMV BLD: 8.8 FL — SIGNIFICANT CHANGE UP (ref 7–13)
POTASSIUM SERPL-MCNC: 4.5 MMOL/L — SIGNIFICANT CHANGE UP (ref 3.5–5.3)
POTASSIUM SERPL-SCNC: 4.5 MMOL/L — SIGNIFICANT CHANGE UP (ref 3.5–5.3)
RBC # BLD: 3.53 M/UL — LOW (ref 4.2–5.8)
RBC # FLD: 13.2 % — SIGNIFICANT CHANGE UP (ref 10.3–14.5)
SODIUM SERPL-SCNC: 134 MMOL/L — LOW (ref 135–145)
WBC # BLD: 6.32 K/UL — SIGNIFICANT CHANGE UP (ref 3.8–10.5)
WBC # FLD AUTO: 6.32 K/UL — SIGNIFICANT CHANGE UP (ref 3.8–10.5)

## 2020-05-29 PROCEDURE — 99233 SBSQ HOSP IP/OBS HIGH 50: CPT | Mod: GC

## 2020-05-29 RX ORDER — METFORMIN HYDROCHLORIDE 850 MG/1
1 TABLET ORAL
Qty: 0 | Refills: 0 | DISCHARGE

## 2020-05-29 RX ORDER — QUETIAPINE FUMARATE 200 MG/1
1 TABLET, FILM COATED ORAL
Qty: 0 | Refills: 0 | DISCHARGE

## 2020-05-29 RX ORDER — LISINOPRIL 2.5 MG/1
1 TABLET ORAL
Qty: 0 | Refills: 0 | DISCHARGE

## 2020-05-29 RX ORDER — QUETIAPINE FUMARATE 200 MG/1
1 TABLET, FILM COATED ORAL
Qty: 30 | Refills: 0
Start: 2020-05-29 | End: 2020-06-27

## 2020-05-29 RX ORDER — ATORVASTATIN CALCIUM 80 MG/1
1 TABLET, FILM COATED ORAL
Qty: 0 | Refills: 0 | DISCHARGE

## 2020-05-29 RX ORDER — DIVALPROEX SODIUM 500 MG/1
1 TABLET, DELAYED RELEASE ORAL
Qty: 60 | Refills: 0
Start: 2020-05-29 | End: 2020-06-27

## 2020-05-29 RX ORDER — MAGNESIUM SULFATE 500 MG/ML
2 VIAL (ML) INJECTION ONCE
Refills: 0 | Status: COMPLETED | OUTPATIENT
Start: 2020-05-29 | End: 2020-05-29

## 2020-05-29 RX ORDER — LANOLIN ALCOHOL/MO/W.PET/CERES
1 CREAM (GRAM) TOPICAL
Qty: 0 | Refills: 0 | DISCHARGE

## 2020-05-29 RX ORDER — SODIUM CHLORIDE 9 MG/ML
500 INJECTION INTRAMUSCULAR; INTRAVENOUS; SUBCUTANEOUS ONCE
Refills: 0 | Status: COMPLETED | OUTPATIENT
Start: 2020-05-29 | End: 2020-05-29

## 2020-05-29 RX ORDER — ASPIRIN/CALCIUM CARB/MAGNESIUM 324 MG
1 TABLET ORAL
Qty: 0 | Refills: 0 | DISCHARGE

## 2020-05-29 RX ORDER — QUETIAPINE FUMARATE 200 MG/1
12.5 TABLET, FILM COATED ORAL ONCE
Refills: 0 | Status: COMPLETED | OUTPATIENT
Start: 2020-05-29 | End: 2020-05-29

## 2020-05-29 RX ORDER — HALOPERIDOL DECANOATE 100 MG/ML
5 INJECTION INTRAMUSCULAR ONCE
Refills: 0 | Status: COMPLETED | OUTPATIENT
Start: 2020-05-29 | End: 2020-05-29

## 2020-05-29 RX ORDER — ASPIRIN/CALCIUM CARB/MAGNESIUM 324 MG
1 TABLET ORAL
Qty: 30 | Refills: 0
Start: 2020-05-29 | End: 2020-06-27

## 2020-05-29 RX ORDER — LANOLIN ALCOHOL/MO/W.PET/CERES
1 CREAM (GRAM) TOPICAL
Qty: 30 | Refills: 0
Start: 2020-05-29 | End: 2020-06-27

## 2020-05-29 RX ORDER — DIVALPROEX SODIUM 500 MG/1
1 TABLET, DELAYED RELEASE ORAL
Qty: 0 | Refills: 0 | DISCHARGE

## 2020-05-29 RX ORDER — LEVETIRACETAM 250 MG/1
2 TABLET, FILM COATED ORAL
Qty: 0 | Refills: 0 | DISCHARGE

## 2020-05-29 RX ORDER — TRAZODONE HCL 50 MG
0.5 TABLET ORAL
Qty: 0 | Refills: 0 | DISCHARGE

## 2020-05-29 RX ORDER — SODIUM CHLORIDE 9 MG/ML
1000 INJECTION INTRAMUSCULAR; INTRAVENOUS; SUBCUTANEOUS
Refills: 0 | Status: DISCONTINUED | OUTPATIENT
Start: 2020-05-29 | End: 2020-05-29

## 2020-05-29 RX ORDER — ATORVASTATIN CALCIUM 80 MG/1
1 TABLET, FILM COATED ORAL
Qty: 30 | Refills: 0
Start: 2020-05-29 | End: 2020-06-27

## 2020-05-29 RX ORDER — TRAZODONE HCL 50 MG
1 TABLET ORAL
Qty: 30 | Refills: 0
Start: 2020-05-29 | End: 2020-06-27

## 2020-05-29 RX ORDER — POLYETHYLENE GLYCOL 3350 17 G/17G
17 POWDER, FOR SOLUTION ORAL DAILY
Refills: 0 | Status: DISCONTINUED | OUTPATIENT
Start: 2020-05-29 | End: 2020-05-30

## 2020-05-29 RX ORDER — INSULIN LISPRO 100/ML
2 VIAL (ML) SUBCUTANEOUS
Qty: 0 | Refills: 0 | DISCHARGE

## 2020-05-29 RX ORDER — INSULIN LISPRO 100/ML
2 VIAL (ML) SUBCUTANEOUS
Qty: 300 | Refills: 0
Start: 2020-05-29 | End: 2020-06-27

## 2020-05-29 RX ORDER — LEVETIRACETAM 250 MG/1
2 TABLET, FILM COATED ORAL
Qty: 120 | Refills: 0
Start: 2020-05-29 | End: 2020-06-27

## 2020-05-29 RX ORDER — METFORMIN HYDROCHLORIDE 850 MG/1
1 TABLET ORAL
Qty: 60 | Refills: 0
Start: 2020-05-29 | End: 2020-06-27

## 2020-05-29 RX ADMIN — QUETIAPINE FUMARATE 12.5 MILLIGRAM(S): 200 TABLET, FILM COATED ORAL at 09:21

## 2020-05-29 RX ADMIN — LEVETIRACETAM 1000 MILLIGRAM(S): 250 TABLET, FILM COATED ORAL at 18:03

## 2020-05-29 RX ADMIN — LEVETIRACETAM 1000 MILLIGRAM(S): 250 TABLET, FILM COATED ORAL at 05:34

## 2020-05-29 RX ADMIN — SODIUM CHLORIDE 100 MILLILITER(S): 9 INJECTION INTRAMUSCULAR; INTRAVENOUS; SUBCUTANEOUS at 12:17

## 2020-05-29 RX ADMIN — Medication 1: at 08:34

## 2020-05-29 RX ADMIN — HEPARIN SODIUM 5000 UNIT(S): 5000 INJECTION INTRAVENOUS; SUBCUTANEOUS at 18:03

## 2020-05-29 RX ADMIN — DIVALPROEX SODIUM 250 MILLIGRAM(S): 500 TABLET, DELAYED RELEASE ORAL at 05:34

## 2020-05-29 RX ADMIN — SODIUM CHLORIDE 1000 MILLILITER(S): 9 INJECTION INTRAMUSCULAR; INTRAVENOUS; SUBCUTANEOUS at 13:58

## 2020-05-29 RX ADMIN — ATORVASTATIN CALCIUM 10 MILLIGRAM(S): 80 TABLET, FILM COATED ORAL at 22:00

## 2020-05-29 RX ADMIN — Medication 1: at 18:02

## 2020-05-29 RX ADMIN — Medication 1 MILLIGRAM(S): at 12:17

## 2020-05-29 RX ADMIN — HEPARIN SODIUM 5000 UNIT(S): 5000 INJECTION INTRAVENOUS; SUBCUTANEOUS at 05:34

## 2020-05-29 RX ADMIN — QUETIAPINE FUMARATE 12.5 MILLIGRAM(S): 200 TABLET, FILM COATED ORAL at 16:24

## 2020-05-29 RX ADMIN — Medication 50 GRAM(S): at 12:18

## 2020-05-29 RX ADMIN — Medication 6 MILLIGRAM(S): at 22:00

## 2020-05-29 RX ADMIN — QUETIAPINE FUMARATE 25 MILLIGRAM(S): 200 TABLET, FILM COATED ORAL at 22:00

## 2020-05-29 RX ADMIN — Medication 81 MILLIGRAM(S): at 12:17

## 2020-05-29 RX ADMIN — QUETIAPINE FUMARATE 12.5 MILLIGRAM(S): 200 TABLET, FILM COATED ORAL at 23:53

## 2020-05-29 RX ADMIN — Medication 25 MILLIGRAM(S): at 22:00

## 2020-05-29 RX ADMIN — DIVALPROEX SODIUM 250 MILLIGRAM(S): 500 TABLET, DELAYED RELEASE ORAL at 18:02

## 2020-05-29 RX ADMIN — POLYETHYLENE GLYCOL 3350 17 GRAM(S): 17 POWDER, FOR SOLUTION ORAL at 12:18

## 2020-05-29 RX ADMIN — HALOPERIDOL DECANOATE 5 MILLIGRAM(S): 100 INJECTION INTRAMUSCULAR at 17:07

## 2020-05-29 NOTE — CHART NOTE - NSCHARTNOTEFT_GEN_A_CORE
Patient increasingly agitated throughout the day.  Patient previously able to be re-oriented, however progressively becoming more agitated and aggressive with staff.  Per psych, seroquel 12.5 mg PO x1 given, however patient continued to have increasing agitation and security was called to bedside.  Haldol 5 mg IVP x1 given, will continue to monitor.

## 2020-05-29 NOTE — PROGRESS NOTE ADULT - PROBLEM SELECTOR PLAN 1
per NH, baseline Cr 0.79 (May 11 2020), now resolved s/p IVF  -likely pre-renal due to ATN possibly 2/2 lisinopril use and decreased PO  -associated w/ mild hyperkalemia, improved s/p IVF (will give PRN)  -U/A with hyaline casts and urine lytes showing pre-renal FeNa  -renal US w/ no acute pathology  -monitor I/O strictly

## 2020-05-29 NOTE — PROGRESS NOTE ADULT - PROBLEM SELECTOR PLAN 3
pH 7.31 in setting of DONA and hypvolemia on admission, now normalized  -most recent pH 7.4 on 5/28  -s/p IVF, will give PRN is acidosis worsening pH 7.31 in setting of DONA and hypvolemia on admission, now normalized  -most recent pH 7.4 on 5/28  -lactate still elevated as of 5/29, will continue to trend  -s/p IVF, will give PRN is acidosis worsening

## 2020-05-29 NOTE — PROGRESS NOTE ADULT - ATTENDING COMMENTS
Pt wants to go home, well appearing - PT re-eval: no PT needs   Cr improved to 0.7, near baseline now   Lactate 2.2, no evidence of infection, will give add'l IVF   Team d/w daughter & CM - anticipate DC home tomorrow AM

## 2020-05-29 NOTE — PROGRESS NOTE ADULT - SUBJECTIVE AND OBJECTIVE BOX
Authored by Dr Arya Richards [phone] [facility]/ [id number] [facility]    Patient is a 60y old  Male who presents with a chief complaint of Agitation (28 May 2020 08:05)    SUBJECTIVE / OVERNIGHT EVENTS:    No acute events overnight.  Patient given seroquel x1 yesterday afternoon for agitation.  No BM, denies diarrhea.    REVIEW OF SYSTEMS:    CONSTITUTIONAL: Denies weakness, fevers, chills  EYES/ENT: Denies visual changes, vertigo, throat pain   NECK: Denies pain, stiffness  RESPIRATORY: Denies cough, wheezing, hemoptysis; Denies shortness of breath  CARDIOVASCULAR: Denies chest pain or palpitations  GASTROINTESTINAL: Denies abdominal or epigastric pain. Denies nausea, vomiting, hematemesis, diarrhea, melena, or hematochezia  GENITOURINARY: Denies dysuria, frequency or hematuria  NEUROLOGICAL: Denies numbness or weakness  SKIN: Denies itching, rashes      MEDICATIONS  (STANDING):  aspirin  chewable 81 milliGRAM(s) Oral daily  atorvastatin 10 milliGRAM(s) Oral at bedtime  dextrose 5%. 1000 milliLiter(s) (50 mL/Hr) IV Continuous <Continuous>  dextrose 50% Injectable 12.5 Gram(s) IV Push once  dextrose 50% Injectable 25 Gram(s) IV Push once  dextrose 50% Injectable 25 Gram(s) IV Push once  diVALproex  milliGRAM(s) Oral two times a day  folic acid 1 milliGRAM(s) Oral daily  heparin   Injectable 5000 Unit(s) SubCutaneous every 12 hours  insulin lispro (HumaLOG) corrective regimen sliding scale   SubCutaneous three times a day before meals  insulin lispro (HumaLOG) corrective regimen sliding scale   SubCutaneous at bedtime  levETIRAcetam 1000 milliGRAM(s) Oral two times a day  melatonin 6 milliGRAM(s) Oral at bedtime  QUEtiapine 25 milliGRAM(s) Oral at bedtime  sodium chloride 0.9%. 1000 milliLiter(s) (100 mL/Hr) IV Continuous <Continuous>  traZODone 25 milliGRAM(s) Oral at bedtime    MEDICATIONS  (PRN):  dextrose 40% Gel 15 Gram(s) Oral once PRN Blood Glucose LESS THAN 70 milliGRAM(s)/deciliter  glucagon  Injectable 1 milliGRAM(s) IntraMuscular once PRN Glucose LESS THAN 70 milligrams/deciliter  QUEtiapine 12.5 milliGRAM(s) Oral every 6 hours PRN Agitation      Vital Signs Last 24 Hrs  T(C): 36.6 (29 May 2020 05:32), Max: 36.9 (28 May 2020 18:03)  T(F): 97.9 (29 May 2020 05:32), Max: 98.5 (28 May 2020 18:03)  HR: 98 (29 May 2020 05:32) (94 - 112)  BP: 123/86 (29 May 2020 05:32) (102/67 - 124/83)  BP(mean): --  RR: 18 (29 May 2020 05:32) (17 - 18)  SpO2: 100% (29 May 2020 05:32) (100% - 100%)  CAPILLARY BLOOD GLUCOSE      POCT Blood Glucose.: 179 mg/dL (28 May 2020 21:22)  POCT Blood Glucose.: 149 mg/dL (28 May 2020 17:20)  POCT Blood Glucose.: 242 mg/dL (28 May 2020 12:08)  POCT Blood Glucose.: 103 mg/dL (28 May 2020 08:31)    I&O's Summary      PHYSICAL EXAM  GENERAL: NAD, well-developed  EYES: conjunctiva and sclera clear  NECK: Supple, No JVD  ENT: MMM  CHEST/LUNG: Clear to auscultation bilaterally; No wheeze  HEART: Regular rate and rhythm; No murmurs  ABDOMEN: Soft, Nontender, Nondistended; Bowel sounds present  EXTREMITIES:  2+ Peripheral Pulses, No clubbing, cyanosis, or edema  NEURO: nonfocal, AOx1-2  PSYCH: calm; disoriented, tangential  SKIN: No rashes or lesions    LABS:                        10.1   5.89  )-----------( 313      ( 28 May 2020 05:30 )             30.4     05-28    135  |  104  |  22  ----------------------------<  120<H>  4.8   |  20<L>  |  0.93    Ca    9.3      28 May 2020 05:30  Phos  3.8     05-28  Mg     1.2     05-28                Culture - Urine (collected 27 May 2020 00:49)  Source: .Urine Clean Catch (Midstream)  Final Report (27 May 2020 21:08):    <10,000 CFU/mL Normal Urogenital Sherrell        RADIOLOGY & ADDITIONAL TESTS:    Imaging Personally Reviewed:  Consultant(s) Notes Reviewed:    Care Discussed with Consultants/Other Providers:

## 2020-05-29 NOTE — PROGRESS NOTE ADULT - PROBLEM SELECTOR PLAN 4
patient endorses diarrhea, however no BM since admission  -monitor stool count  -check GI PCR if diarrhea continues

## 2020-05-29 NOTE — PROGRESS NOTE ADULT - ASSESSMENT
60 M hx of recent CVA, HLD, HTN, DM2 presenting from [facility] with a chief complaint of agitation, found to have DONA on admission.

## 2020-05-30 VITALS
RESPIRATION RATE: 17 BRPM | OXYGEN SATURATION: 99 % | HEART RATE: 92 BPM | TEMPERATURE: 98 F | SYSTOLIC BLOOD PRESSURE: 110 MMHG | DIASTOLIC BLOOD PRESSURE: 83 MMHG

## 2020-05-30 LAB — LACTATE SERPL-SCNC: 1 MMOL/L — SIGNIFICANT CHANGE UP (ref 0.5–2)

## 2020-05-30 PROCEDURE — 99239 HOSP IP/OBS DSCHRG MGMT >30: CPT

## 2020-05-30 RX ORDER — HALOPERIDOL DECANOATE 100 MG/ML
2.5 INJECTION INTRAMUSCULAR ONCE
Refills: 0 | Status: COMPLETED | OUTPATIENT
Start: 2020-05-30 | End: 2020-05-30

## 2020-05-30 RX ADMIN — Medication 1 MILLIGRAM(S): at 08:34

## 2020-05-30 RX ADMIN — LEVETIRACETAM 1000 MILLIGRAM(S): 250 TABLET, FILM COATED ORAL at 05:59

## 2020-05-30 RX ADMIN — DIVALPROEX SODIUM 250 MILLIGRAM(S): 500 TABLET, DELAYED RELEASE ORAL at 05:59

## 2020-05-30 RX ADMIN — HALOPERIDOL DECANOATE 2.5 MILLIGRAM(S): 100 INJECTION INTRAMUSCULAR at 00:39

## 2020-05-30 RX ADMIN — Medication 81 MILLIGRAM(S): at 08:34

## 2020-05-30 NOTE — PROGRESS NOTE ADULT - PROBLEM SELECTOR PLAN 3
pH 7.31 in setting of DONA and hypvolemia on admission, now normalized  -most recent pH 7.4 on 5/28  -lactate still elevated as of 5/29, will continue to trend  -s/p IVF, will give PRN is acidosis worsening

## 2020-05-30 NOTE — PROGRESS NOTE ADULT - ATTENDING COMMENTS
Patient seen and examined.  Case discussed with house staff.  Agree with above as edited.   Patient is a 60yoM with recent CVA, DM type II, HLD, HTN, presenting from [facility] with agitation, found to have DONA which resolved with IV fluid resuscitation. Patient also with lactic acidosis which has also resolved. No plan to d/c home.  D/C time: 40 minutes   Team d/w family      Jose Moyer MD.    Attending Physician, [facility].   x [phone]

## 2020-05-30 NOTE — PROGRESS NOTE ADULT - ASSESSMENT
60 M hx of recent CVA, HLD, HTN, DM2 presenting from [facility] with a chief complaint of agitation, found to have DONA on admission hospital course now c/b intermittent episodes of agitation 60 M hx of recent CVA, HLD, HTN, DM2 presenting from [facility] with a chief complaint of agitation, found to have DONA on admission hospital course now c/b intermittent episodes of agitation.

## 2020-05-30 NOTE — PROGRESS NOTE ADULT - SUBJECTIVE AND OBJECTIVE BOX
PROGRESS NOTE:     Patient is a 60y old  Male who presents with a chief complaint of Agitation (29 May 2020 08:19)      SUBJECTIVE / OVERNIGHT EVENTS: Pt agitated and threatening to leave unit- Given Haldol 2.5mg IM x1 and Seroquel 12.5 PO x1.    ADDITIONAL REVIEW OF SYSTEMS:    MEDICATIONS  (STANDING):  aspirin  chewable 81 milliGRAM(s) Oral daily  atorvastatin 10 milliGRAM(s) Oral at bedtime  dextrose 5%. 1000 milliLiter(s) (50 mL/Hr) IV Continuous <Continuous>  dextrose 50% Injectable 12.5 Gram(s) IV Push once  dextrose 50% Injectable 25 Gram(s) IV Push once  dextrose 50% Injectable 25 Gram(s) IV Push once  diVALproex  milliGRAM(s) Oral two times a day  folic acid 1 milliGRAM(s) Oral daily  heparin   Injectable 5000 Unit(s) SubCutaneous every 12 hours  insulin lispro (HumaLOG) corrective regimen sliding scale   SubCutaneous three times a day before meals  insulin lispro (HumaLOG) corrective regimen sliding scale   SubCutaneous at bedtime  levETIRAcetam 1000 milliGRAM(s) Oral two times a day  melatonin 6 milliGRAM(s) Oral at bedtime  polyethylene glycol 3350 17 Gram(s) Oral daily  QUEtiapine 25 milliGRAM(s) Oral at bedtime  traZODone 25 milliGRAM(s) Oral at bedtime    MEDICATIONS  (PRN):  dextrose 40% Gel 15 Gram(s) Oral once PRN Blood Glucose LESS THAN 70 milliGRAM(s)/deciliter  glucagon  Injectable 1 milliGRAM(s) IntraMuscular once PRN Glucose LESS THAN 70 milligrams/deciliter      CAPILLARY BLOOD GLUCOSE      POCT Blood Glucose.: 159 mg/dL (29 May 2020 21:24)  POCT Blood Glucose.: 185 mg/dL (29 May 2020 17:43)  POCT Blood Glucose.: 129 mg/dL (29 May 2020 12:05)  POCT Blood Glucose.: 156 mg/dL (29 May 2020 08:28)    I&O's Summary    29 May 2020 07:01  -  30 May 2020 07:00  --------------------------------------------------------  IN: 818 mL / OUT: 400 mL / NET: 418 mL        PHYSICAL EXAM:  Vital Signs Last 24 Hrs  T(C): 36.9 (30 May 2020 06:02), Max: 37.1 (29 May 2020 21:59)  T(F): 98.4 (30 May 2020 06:02), Max: 98.8 (29 May 2020 21:59)  HR: 92 (30 May 2020 06:02) (85 - 97)  BP: 110/83 (30 May 2020 06:02) (110/83 - 125/85)  BP(mean): --  RR: 17 (30 May 2020 06:02) (17 - 18)  SpO2: 99% (30 May 2020 06:02) (99% - 100%)    CONSTITUTIONAL: NAD, well-developed  RESPIRATORY: Normal respiratory effort; lungs are clear to auscultation bilaterally  CARDIOVASCULAR: Regular rate and rhythm, normal S1 and S2, no murmur/rub/gallop; No lower extremity edema; Peripheral pulses are 2+ bilaterally  ABDOMEN: Nontender to palpation, normoactive bowel sounds, no rebound/guarding; No hepatosplenomegaly  MUSCLOSKELETAL: no clubbing or cyanosis of digits; no joint swelling or tenderness to palpation  PSYCH: A+O to person, place, and time; affect appropriate    LABS:                        10.4   6.32  )-----------( 323      ( 29 May 2020 07:47 )             31.6     05-29    134<L>  |  101  |  14  ----------------------------<  142<H>  4.5   |  22  |  0.74    Ca    9.7      29 May 2020 07:47  Phos  3.4     05-29  Mg     1.3     05-29                  RADIOLOGY & ADDITIONAL TESTS:  Results Reviewed:   Imaging Personally Reviewed:  Electrocardiogram Personally Reviewed:    COORDINATION OF CARE:  Care Discussed with Consultants/Other Providers [Y/N]:  Prior or Outpatient Records Reviewed [Y/N]: PROGRESS NOTE:     Patient is a 60y old  Male who presents with a chief complaint of Agitation (29 May 2020 08:19)      SUBJECTIVE / OVERNIGHT EVENTS: Pt agitated and threatening to leave unit overnight- Given Haldol 2.5mg IM x1 and Seroquel 12.5 PO x1. This AM states he wants to go home.    ADDITIONAL REVIEW OF SYSTEMS:    MEDICATIONS  (STANDING):  aspirin  chewable 81 milliGRAM(s) Oral daily  atorvastatin 10 milliGRAM(s) Oral at bedtime  dextrose 5%. 1000 milliLiter(s) (50 mL/Hr) IV Continuous <Continuous>  dextrose 50% Injectable 12.5 Gram(s) IV Push once  dextrose 50% Injectable 25 Gram(s) IV Push once  dextrose 50% Injectable 25 Gram(s) IV Push once  diVALproex  milliGRAM(s) Oral two times a day  folic acid 1 milliGRAM(s) Oral daily  heparin   Injectable 5000 Unit(s) SubCutaneous every 12 hours  insulin lispro (HumaLOG) corrective regimen sliding scale   SubCutaneous three times a day before meals  insulin lispro (HumaLOG) corrective regimen sliding scale   SubCutaneous at bedtime  levETIRAcetam 1000 milliGRAM(s) Oral two times a day  melatonin 6 milliGRAM(s) Oral at bedtime  polyethylene glycol 3350 17 Gram(s) Oral daily  QUEtiapine 25 milliGRAM(s) Oral at bedtime  traZODone 25 milliGRAM(s) Oral at bedtime    MEDICATIONS  (PRN):  dextrose 40% Gel 15 Gram(s) Oral once PRN Blood Glucose LESS THAN 70 milliGRAM(s)/deciliter  glucagon  Injectable 1 milliGRAM(s) IntraMuscular once PRN Glucose LESS THAN 70 milligrams/deciliter      CAPILLARY BLOOD GLUCOSE      POCT Blood Glucose.: 159 mg/dL (29 May 2020 21:24)  POCT Blood Glucose.: 185 mg/dL (29 May 2020 17:43)  POCT Blood Glucose.: 129 mg/dL (29 May 2020 12:05)  POCT Blood Glucose.: 156 mg/dL (29 May 2020 08:28)    I&O's Summary    29 May 2020 07:01  -  30 May 2020 07:00  --------------------------------------------------------  IN: 818 mL / OUT: 400 mL / NET: 418 mL        PHYSICAL EXAM:  Vital Signs Last 24 Hrs  T(C): 36.9 (30 May 2020 06:02), Max: 37.1 (29 May 2020 21:59)  T(F): 98.4 (30 May 2020 06:02), Max: 98.8 (29 May 2020 21:59)  HR: 92 (30 May 2020 06:02) (85 - 97)  BP: 110/83 (30 May 2020 06:02) (110/83 - 125/85)  BP(mean): --  RR: 17 (30 May 2020 06:02) (17 - 18)  SpO2: 99% (30 May 2020 06:02) (99% - 100%)    PHYSICAL EXAM  GENERAL: NAD, well-developed  EYES: conjunctiva and sclera clear  NECK: Supple, No JVD  ENT: MMM  CHEST/LUNG: Clear to auscultation bilaterally; No wheeze  HEART: Regular rate and rhythm; No murmurs  ABDOMEN: Soft, Nontender, Nondistended; Bowel sounds present  EXTREMITIES:  2+ Peripheral Pulses, No clubbing, cyanosis, or edema  NEURO: nonfocal, AOx1-2  PSYCH: calm; disoriented, tangential  SKIN: No rashes or lesions    LABS:                        10.4   6.32  )-----------( 323      ( 29 May 2020 07:47 )             31.6     05-29    134<L>  |  101  |  14  ----------------------------<  142<H>  4.5   |  22  |  0.74    Ca    9.7      29 May 2020 07:47  Phos  3.4     05-29  Mg     1.3     05-29                  RADIOLOGY & ADDITIONAL TESTS:  Results Reviewed:   Imaging Personally Reviewed:  Electrocardiogram Personally Reviewed:    COORDINATION OF CARE:  Care Discussed with Consultants/Other Providers [Y/N]:  Prior or Outpatient Records Reviewed [Y/N]:

## 2020-05-30 NOTE — PROGRESS NOTE ADULT - PROBLEM SELECTOR PLAN 2
likely 2/2 recent CVA  -CTH negative on admission  -psych following- no Hx of psychiatric illness prior to CVA 2 weeks ago --> c/w seroquel and trazadone at bedtime  -cleared by psych for d/c

## 2020-05-30 NOTE — PROGRESS NOTE ADULT - PROBLEM SELECTOR PLAN 10
Transitions of Care Status:  1.  Name of PCP:  2.  PCP Contacted on Admission: [ ] Y    [ ] N    3.  PCP contacted at Discharge: [ ] Y    [ ] N    [ ] N/A  4.  Post-Discharge Appointment Date and Location:  5.  Summary of Handoff given to PCP: Transitions of Care Status:  1.  Name of PCP:  2.  PCP Contacted on Admission: [ ] Y    [ ] N    3.  PCP contacted at Discharge: [ ] Y    [x ] N- Office closed   [ ] N/A  4.  Post-Discharge Appointment Date and Location:  5.  Summary of Handoff given to PCP:

## 2020-05-30 NOTE — DISCHARGE NOTE NURSING/CASE MANAGEMENT/SOCIAL WORK - PATIENT PORTAL LINK FT
You can access the FollowMyHealth Patient Portal offered by [facility] by registering at the following website: [email or web address]. By joining [employer]’s FollowMyHealth portal, you will also be able to view your health information using other applications (apps) compatible with our system.

## 2020-05-30 NOTE — PROGRESS NOTE ADULT - PROBLEM SELECTOR PLAN 9
DVT ppx: heparin subq  Diet: Renal restrictions  Dispo: [facility], then [facility] DVT ppx: heparin subq  Diet: Renal restrictions  Dispo: Home, no PT needs

## 2021-06-06 NOTE — DISCHARGE NOTE PROVIDER - NSDCCPCAREPLAN_GEN_ALL_CORE_FT
Assessment:      Healthy male exam.    1. Routine general medical examination at a health care facility  Normal exam.  Discussed healthy eating and lifestyle.    2. Screen for colon cancer  He will be due for Cologuard in 2021    3. Erectile dysfunction due to diseases classified elsewhere  Patient would like to try medication to augment erections.  He has had more difficulty just this past year.  - tadalafiL (CIALIS) 10 MG tablet; Take 1 tablet (10 mg total) by mouth as needed for erectile dysfunction.  Dispense: 20 tablet; Refill: 1    4. Preventative health care  Blood work as follows.  - PSA (Prostatic-Specific Antigen), Annual Screen  - Basic Metabolic Panel  - Lipid Cascade  - Thyroid Stimulating Hormone (TSH)  - HM2(CBC w/o Differential)    5. Need for prophylactic vaccination against diphtheria and tetanus  Tetanus is updated  - Tdap vaccine greater than or equal to 8yo IM    6. Hypertension, unspecified type  Well-controlled.         Plan:      Continue the same and return for blood pressure check in 6 months.Effie Albert MD      Subjective:      Valdo Lyons is a 60 y.o. male who presents for an annual exam. The patient reports that there is not domestic violence in his life.  He enjoys travel with his wife.  They enjoy watching drag racing.    Healthy Habits:   Regular Exercise: No  Sunscreen Use: Yes  Healthy Diet: Yes  Dental Visits Regularly: No  Seat Belt: Yes  Sexually active: Yes  Monthly Self Testicular Exams:  No  Hemoccults: No  Flex Sig: No  Colonoscopy: No  Lipid Profile: Yes  Glucose Screen: Yes  Prevention of Osteoporosis: Yes  Last Dexa: No  Guns at Home:  Yes  Guns Safety Locks:  No      Immunization History   Administered Date(s) Administered     Tdap 02/27/2020           Current Outpatient Medications   Medication Sig Dispense Refill     amLODIPine (NORVASC) 5 MG tablet Take 1 tablet (5 mg total) by mouth daily. 7 tablet 0     amLODIPine (NORVASC) 5 MG tablet Take 1 tablet (5 mg  total) by mouth daily. 30 tablet 2     aspirin 81 MG EC tablet Take 81 mg by mouth daily.       atorvastatin (LIPITOR) 40 MG tablet TAKE ONE TABLET BY MOUTH AT BEDTIME  90 tablet 1     hydroCHLOROthiazide (HYDRODIURIL) 25 MG tablet Take 1 tablet (25 mg total) by mouth daily. 7 tablet 0     hydroCHLOROthiazide (HYDRODIURIL) 25 MG tablet Take 1 tablet (25 mg total) by mouth daily. 90 tablet 2     metoprolol tartrate (LOPRESSOR) 25 MG tablet Take 1 tablet (25 mg total) by mouth 2 (two) times a day. 14 tablet 0     metoprolol tartrate (LOPRESSOR) 25 MG tablet Take one tablet by mouth twice daily for blood pressure 60 tablet 2     tadalafiL (CIALIS) 10 MG tablet Take 1 tablet (10 mg total) by mouth as needed for erectile dysfunction. 20 tablet 1     No current facility-administered medications for this visit.      No past medical history on file.  No past surgical history on file.  Patient has no known allergies.  Family History   Problem Relation Age of Onset     Dementia Mother      Glaucoma Mother      Atrial fibrillation Father      Diabetes type II Father      Hyperlipidemia Father      Heart attack Brother          at 42     No Medical Problems Paternal Uncle      No Medical Problems Brother      Social History     Socioeconomic History     Marital status:      Spouse name: Not on file     Number of children: Not on file     Years of education: Not on file     Highest education level: Not on file   Occupational History     Not on file   Social Needs     Financial resource strain: Not on file     Food insecurity:     Worry: Not on file     Inability: Not on file     Transportation needs:     Medical: Not on file     Non-medical: Not on file   Tobacco Use     Smoking status: Former Smoker     Last attempt to quit: 3/9/1997     Years since quittin.9     Smokeless tobacco: Never Used   Substance and Sexual Activity     Alcohol use: Not on file     Drug use: Not on file     Sexual activity: Not on file  "  Lifestyle     Physical activity:     Days per week: Not on file     Minutes per session: Not on file     Stress: Not on file   Relationships     Social connections:     Talks on phone: Not on file     Gets together: Not on file     Attends [employer] service: Not on file     Active member of club or organization: Not on file     Attends meetings of clubs or organizations: Not on file     Relationship status: Not on file     Intimate partner violence:     Fear of current or ex partner: Not on file     Emotionally abused: Not on file     Physically abused: Not on file     Forced sexual activity: Not on file   Other Topics Concern     Not on file   Social History Narrative     Not on file         Review of Systems          Objective:     Vitals:    02/27/20 0757   BP: 132/78   Pulse: (!) 54   Resp: 18   Temp: 98.1  F (36.7  C)   SpO2: 98%   Weight: (!) 282 lb (127.9 kg)   Height: 5' 9.5\" (1.765 m)     Body mass index is 41.05 kg/m .    Physical  Physical Exam     Review of Systems  General:  Denies fever, chills, HA, fatigue, myalgias, weight change    Eyes: Denies vision changes   Ears/Nose/Throat: Denies nasal congestion, rhinorrhea, ear pain or discharge, sore throat, swollen glands  Cardiovascular: CP, palpitations  Respiratory:  SOB, cough  Gastrointestinal:  Denies changes in bowel habits, melena, rectal bleeding,  Genitourinary: Denies changes in urine habits/frequency/dysuria, hematuria   Musculoskeletal:  Denies  joint pain or swelling or erythema, swelling  Skin: Denies rashes   Neurologic: Denies weakness, paresthesia  Psychiatric: Denies mood changes   Endocrine: Denies polyuria, polydipsia, polyphagia  Heme/Lymphatic: Denies problem with bleeding   Allergic/Immunologic: Denies problem      Objective:     Vitals:    02/27/20 0757   BP: 132/78   Pulse: (!) 54   Resp: 18   Temp: 98.1  F (36.7  C)   SpO2: 98%   Weight: (!) 282 lb (127.9 kg)   Height: 5' 9.5\" (1.765 m)       Physical  General Appearance: " Alert, cooperative, no distress, appears stated age  Head: Normocephalic, without obvious abnormality, atraumatic  Eyes: PERRL, conjunctiva/corneas clear, EOM's intact  Ears: Normal TM's and external ear canals, both ears  Nose: Nares normal, septum midline,mucosa normal, no drainage  Throat: Lips, mucosa, and tongue normal; teeth and gums normal  Neck: Supple, symmetrical, trachea midline, no adenopathy;  thyroid: not enlarged, symmetric, no tenderness/mass/nodules; no carotid bruit or JVD  Back: Symmetric, no curvature, ROM normal, no CVA tenderness  Lungs: Clear to auscultation bilaterally, respirations unlabored  Heart: Regular rate and rhythm, S1 and S2 normal, no murmur, rub, or gallop,  Abdomen: Soft, non-tender, bowel sounds active all four quadrants,  no masses, no organomegaly  Genitourinary: Penis normal. Right testis is descended. Left testis is descended.   RECTAL: Normal sphincter tone.  Prostate is smooth and not enlarged.  No masses  Musculoskeletal: Normal range of motion. No joint swelling or deformity.   Extremities: Extremities normal, atraumatic, no swelling  Skin: Skin color, texture, turgor normal, no rashes or lesions  Lymph nodes: Cervical, supraclavicular, and axillary nodes normal  Neurologic: He is alert. He has normal reflexes.   Psychiatric: He has a normal mood and affect.          PRINCIPAL DISCHARGE DIAGNOSIS  Diagnosis: Agitation  Assessment and Plan of Treatment: you were agitated at the nursing home, however you were not agitated at the hospital.  you were placed on your home seizure and schizophrenia medications and cleared by psychiatry before discharge.  please follow up with your psychiatrist following discharge      SECONDARY DISCHARGE DIAGNOSES  Diagnosis: DONA (acute kidney injury)  Assessment and Plan of Treatment: you had kidney injury from dehydration.  you were given IV fluids and held your lisinopril and your kidney function improved.  you had a kindey ultrasound that did not show any disease. please follow up with your [facility] care doctor following discharge for this    Diagnosis: Essential hypertension  Assessment and Plan of Treatment: we held your home lisinopril because your kidney function was decraesed.  please follow up with your primary care doctor before discharge for resuming this PRINCIPAL DISCHARGE DIAGNOSIS  Diagnosis: Agitation  Assessment and Plan of Treatment: you were agitated at the nursing home, however you were not agitated at the hospital.  you were seem by psychiatry who adjusted your medications and cleared you for discharge      SECONDARY DISCHARGE DIAGNOSES  Diagnosis: DONA (acute kidney injury)  Assessment and Plan of Treatment: you had kidney injury from dehydration.  you were given IV fluids and held your lisinopril and your kidney function improved.  you had a kindey ultrasound that did not show any disease. please follow up with your [facility] care doctor following discharge for this    Diagnosis: Essential hypertension  Assessment and Plan of Treatment: we held your home lisinopril because your kidney function was decraesed.  please follow up with your primary care doctor before discharge for resuming this PRINCIPAL DISCHARGE DIAGNOSIS  Diagnosis: Agitation  Assessment and Plan of Treatment: you were agitated at the nursing home, however you were not agitated at the hospital.  you were seem by psychiatry who adjusted your medications and cleared you for discharge.  continue to take trazadone and seroquel      SECONDARY DISCHARGE DIAGNOSES  Diagnosis: Prophylactic measure  Assessment and Plan of Treatment: please continue to take home diabtetes, seizure prophylaxis and high cholesterol medications (insulin, metformin, statin, aspirin, keppra, depakote)    Diagnosis: DONA (acute kidney injury)  Assessment and Plan of Treatment: you had kidney injury from dehydration.  you were given IV fluids and held your lisinopril and your kidney function improved.  you had a kindey ultrasound that did not show any disease. please follow up with your [place] care doctor following discharge for this    Diagnosis: Essential hypertension  Assessment and Plan of Treatment: we held your home lisinopril because your kidney function was decraesed.  please follow up with your primary care doctor before discharge for resuming this PRINCIPAL DISCHARGE DIAGNOSIS  Diagnosis: Agitation  Assessment and Plan of Treatment: you were agitated at the nursing home, however you were not agitated at the hospital.  you were seem by psychiatry who adjusted your medications and cleared you for discharge.  continue to take trazadone and seroquel      SECONDARY DISCHARGE DIAGNOSES  Diagnosis: Prophylactic measure  Assessment and Plan of Treatment: please continue to take home diabtetes, seizure prophylaxis and high cholesterol medications (metformin, statin, aspirin, keppra, depakote)    Diagnosis: Essential hypertension  Assessment and Plan of Treatment: we held your home lisinopril because your kidney function was decraesed.  please follow up with your primary care doctor before discharge for resuming this within 1-2 weeks    Diagnosis: DONA (acute kidney injury)  Assessment and Plan of Treatment: you had kidney injury from dehydration.  you were given IV fluids and held your lisinopril and your kidney function improved.  you had a kindey ultrasound that did not show any disease. please follow up with your [facility] care doctor following discharge for this

## 2021-06-13 NOTE — ED ADULT NURSE NOTE - CAS TRG [employer]AL [employer], MLM
Will provider respond to nursing request-patient set up to establish care with Dr. Francois 12/14/2020.    Patent

## 2023-10-31 NOTE — PROGRESS NOTE ADULT - PROBLEM SELECTOR PROBLEM 5
Type 2 diabetes mellitus without complication, without long-term current use of insulin

## 2024-12-06 NOTE — BEHAVIORAL HEALTH ASSESSMENT NOTE - NSBHREFER_PSY_A_CORE
inpatient Medical/Surgical team ,DirectAddress_Unknown,DirectAddress_Unknown,DirectAddress_Unknown ,DirectAddress_Unknown,DirectAddress_Unknown